# Patient Record
Sex: FEMALE | Race: WHITE | NOT HISPANIC OR LATINO | URBAN - METROPOLITAN AREA
[De-identification: names, ages, dates, MRNs, and addresses within clinical notes are randomized per-mention and may not be internally consistent; named-entity substitution may affect disease eponyms.]

---

## 2021-05-25 ENCOUNTER — INPATIENT (INPATIENT)
Facility: HOSPITAL | Age: 21
LOS: 2 days | Discharge: HOME | End: 2021-05-28
Attending: PSYCHIATRY & NEUROLOGY | Admitting: PSYCHIATRY & NEUROLOGY
Payer: COMMERCIAL

## 2021-05-25 ENCOUNTER — INPATIENT (INPATIENT)
Facility: HOSPITAL | Age: 21
LOS: 0 days | Discharge: PSYCHIATRIC OR SUBSTANCE USE FACILITY | DRG: 885 | End: 2021-05-25
Attending: PSYCHIATRY & NEUROLOGY | Admitting: PSYCHIATRY & NEUROLOGY
Payer: COMMERCIAL

## 2021-05-25 VITALS
DIASTOLIC BLOOD PRESSURE: 80 MMHG | HEART RATE: 61 BPM | TEMPERATURE: 98 F | RESPIRATION RATE: 17 BRPM | OXYGEN SATURATION: 97 % | SYSTOLIC BLOOD PRESSURE: 114 MMHG

## 2021-05-25 VITALS — WEIGHT: 126.32 LBS | HEIGHT: 64 IN | TEMPERATURE: 98 F | RESPIRATION RATE: 18 BRPM | OXYGEN SATURATION: 99 %

## 2021-05-25 VITALS — WEIGHT: 110.01 LBS

## 2021-05-25 DIAGNOSIS — F12.10 CANNABIS ABUSE, UNCOMPLICATED: ICD-10-CM

## 2021-05-25 DIAGNOSIS — F60.3 BORDERLINE PERSONALITY DISORDER: ICD-10-CM

## 2021-05-25 DIAGNOSIS — F33.2 MAJOR DEPRESSIVE DISORDER, RECURRENT SEVERE WITHOUT PSYCHOTIC FEATURES: ICD-10-CM

## 2021-05-25 DIAGNOSIS — F43.10 POST-TRAUMATIC STRESS DISORDER, UNSPECIFIED: ICD-10-CM

## 2021-05-25 DIAGNOSIS — F41.1 GENERALIZED ANXIETY DISORDER: ICD-10-CM

## 2021-05-25 DIAGNOSIS — F32.9 MAJOR DEPRESSIVE DISORDER, SINGLE EPISODE, UNSPECIFIED: ICD-10-CM

## 2021-05-25 LAB
ALBUMIN SERPL ELPH-MCNC: 3.8 G/DL — SIGNIFICANT CHANGE UP (ref 3.3–5)
ALP SERPL-CCNC: 55 U/L — SIGNIFICANT CHANGE UP (ref 40–120)
ALT FLD-CCNC: 17 U/L — SIGNIFICANT CHANGE UP (ref 12–78)
ANION GAP SERPL CALC-SCNC: 4 MMOL/L — LOW (ref 5–17)
APAP SERPL-MCNC: < 2 UG/ML (ref 10–30)
APPEARANCE UR: CLEAR — SIGNIFICANT CHANGE UP
AST SERPL-CCNC: 13 U/L — LOW (ref 15–37)
BASOPHILS # BLD AUTO: 0.03 K/UL — SIGNIFICANT CHANGE UP (ref 0–0.2)
BASOPHILS NFR BLD AUTO: 0.4 % — SIGNIFICANT CHANGE UP (ref 0–2)
BILIRUB SERPL-MCNC: 0.2 MG/DL — SIGNIFICANT CHANGE UP (ref 0.2–1.2)
BILIRUB UR-MCNC: NEGATIVE — SIGNIFICANT CHANGE UP
BUN SERPL-MCNC: 9 MG/DL — SIGNIFICANT CHANGE UP (ref 7–23)
CALCIUM SERPL-MCNC: 10 MG/DL — SIGNIFICANT CHANGE UP (ref 8.5–10.1)
CHLORIDE SERPL-SCNC: 108 MMOL/L — SIGNIFICANT CHANGE UP (ref 96–108)
CO2 SERPL-SCNC: 27 MMOL/L — SIGNIFICANT CHANGE UP (ref 22–31)
COLOR SPEC: YELLOW — SIGNIFICANT CHANGE UP
COVID-19 SPIKE DOMAIN AB INTERP: POSITIVE
COVID-19 SPIKE DOMAIN ANTIBODY RESULT: 41.9 U/ML — HIGH
CREAT SERPL-MCNC: 0.65 MG/DL — SIGNIFICANT CHANGE UP (ref 0.5–1.3)
DIFF PNL FLD: NEGATIVE — SIGNIFICANT CHANGE UP
EOSINOPHIL # BLD AUTO: 0.21 K/UL — SIGNIFICANT CHANGE UP (ref 0–0.5)
EOSINOPHIL NFR BLD AUTO: 3 % — SIGNIFICANT CHANGE UP (ref 0–6)
ETHANOL SERPL-MCNC: <10 MG/DL — SIGNIFICANT CHANGE UP (ref 0–10)
GLUCOSE SERPL-MCNC: 96 MG/DL — SIGNIFICANT CHANGE UP (ref 70–99)
GLUCOSE UR QL: NEGATIVE MG/DL — SIGNIFICANT CHANGE UP
HCG SERPL-ACNC: <1 MIU/ML — SIGNIFICANT CHANGE UP
HCT VFR BLD CALC: 39.4 % — SIGNIFICANT CHANGE UP (ref 34.5–45)
HGB BLD-MCNC: 12.9 G/DL — SIGNIFICANT CHANGE UP (ref 11.5–15.5)
HIV 1 & 2 AB SERPL IA.RAPID: SIGNIFICANT CHANGE UP
IMM GRANULOCYTES NFR BLD AUTO: 0.3 % — SIGNIFICANT CHANGE UP (ref 0–1.5)
KETONES UR-MCNC: NEGATIVE — SIGNIFICANT CHANGE UP
LEUKOCYTE ESTERASE UR-ACNC: ABNORMAL
LYMPHOCYTES # BLD AUTO: 0.89 K/UL — LOW (ref 1–3.3)
LYMPHOCYTES # BLD AUTO: 12.9 % — LOW (ref 13–44)
MCHC RBC-ENTMCNC: 30.2 PG — SIGNIFICANT CHANGE UP (ref 27–34)
MCHC RBC-ENTMCNC: 32.7 GM/DL — SIGNIFICANT CHANGE UP (ref 32–36)
MCV RBC AUTO: 92.3 FL — SIGNIFICANT CHANGE UP (ref 80–100)
MONOCYTES # BLD AUTO: 0.17 K/UL — SIGNIFICANT CHANGE UP (ref 0–0.9)
MONOCYTES NFR BLD AUTO: 2.5 % — SIGNIFICANT CHANGE UP (ref 2–14)
NEUTROPHILS # BLD AUTO: 5.59 K/UL — SIGNIFICANT CHANGE UP (ref 1.8–7.4)
NEUTROPHILS NFR BLD AUTO: 80.9 % — HIGH (ref 43–77)
NITRITE UR-MCNC: NEGATIVE — SIGNIFICANT CHANGE UP
PCP SPEC-MCNC: SIGNIFICANT CHANGE UP
PH UR: 7 — SIGNIFICANT CHANGE UP (ref 5–8)
PLATELET # BLD AUTO: 282 K/UL — SIGNIFICANT CHANGE UP (ref 150–400)
POTASSIUM SERPL-MCNC: 4.2 MMOL/L — SIGNIFICANT CHANGE UP (ref 3.5–5.3)
POTASSIUM SERPL-SCNC: 4.2 MMOL/L — SIGNIFICANT CHANGE UP (ref 3.5–5.3)
PROT SERPL-MCNC: 8.3 GM/DL — SIGNIFICANT CHANGE UP (ref 6–8.3)
PROT UR-MCNC: NEGATIVE MG/DL — SIGNIFICANT CHANGE UP
RAPID RVP RESULT: DETECTED
RBC # BLD: 4.27 M/UL — SIGNIFICANT CHANGE UP (ref 3.8–5.2)
RBC # FLD: 13.2 % — SIGNIFICANT CHANGE UP (ref 10.3–14.5)
RV+EV RNA SPEC QL NAA+PROBE: DETECTED
SALICYLATES SERPL-MCNC: <1.7 MG/DL — LOW (ref 2.8–20)
SARS-COV-2 IGG+IGM SERPL QL IA: 41.9 U/ML — HIGH
SARS-COV-2 IGG+IGM SERPL QL IA: POSITIVE
SARS-COV-2 RNA SPEC QL NAA+PROBE: SIGNIFICANT CHANGE UP
SARS-COV-2 RNA SPEC QL NAA+PROBE: SIGNIFICANT CHANGE UP
SODIUM SERPL-SCNC: 139 MMOL/L — SIGNIFICANT CHANGE UP (ref 135–145)
SP GR SPEC: 1.01 — SIGNIFICANT CHANGE UP (ref 1.01–1.02)
UROBILINOGEN FLD QL: NEGATIVE MG/DL — SIGNIFICANT CHANGE UP
WBC # BLD: 6.91 K/UL — SIGNIFICANT CHANGE UP (ref 3.8–10.5)
WBC # FLD AUTO: 6.91 K/UL — SIGNIFICANT CHANGE UP (ref 3.8–10.5)

## 2021-05-25 PROCEDURE — 90792 PSYCH DIAG EVAL W/MED SRVCS: CPT

## 2021-05-25 PROCEDURE — 0225U NFCT DS DNA&RNA 21 SARSCOV2: CPT

## 2021-05-25 PROCEDURE — 99285 EMERGENCY DEPT VISIT HI MDM: CPT

## 2021-05-25 PROCEDURE — 93010 ELECTROCARDIOGRAM REPORT: CPT

## 2021-05-25 RX ORDER — VENLAFAXINE HCL 75 MG
150 CAPSULE, EXT RELEASE 24 HR ORAL DAILY
Refills: 0 | Status: DISCONTINUED | OUTPATIENT
Start: 2021-05-25 | End: 2021-05-25

## 2021-05-25 RX ORDER — TRAZODONE HCL 50 MG
100 TABLET ORAL AT BEDTIME
Refills: 0 | Status: DISCONTINUED | OUTPATIENT
Start: 2021-05-25 | End: 2021-05-25

## 2021-05-25 RX ADMIN — Medication 150 MILLIGRAM(S): at 18:29

## 2021-05-25 NOTE — ED BEHAVIORAL HEALTH ASSESSMENT NOTE - PSYCHIATRIC ISSUES AND PLAN (INCLUDE STANDING AND PRN MEDICATION)
restart home meds: Buspar 10mg PO BID, Venlafaxine 275 mg, Trazodne 100mg at HS, and Haldol5/ativan 2 mg PRN for agitation.

## 2021-05-25 NOTE — ED PROVIDER NOTE - CLINICAL SUMMARY MEDICAL DECISION MAKING FREE TEXT BOX
21 y/o here for psych evaluation, medical screening exam, psych consult. 19 y/o here for psych evaluation, medical screening exam, psych consult-->requires involuntary admission

## 2021-05-25 NOTE — ED BEHAVIORAL HEALTH ASSESSMENT NOTE - DESCRIPTION
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   [facility] phone [phone].   Therapist Abi states that to told her today that she would use gun if she had access to it. But, since her brother got rid of his gun, she expressed plan to go home into the [place] , look for something to hang herself.   medication is clarified. Pt takes Effexor xr 150mg po qam and n ot 275 like pt said earlier. Buspar is 10mg po BID and olbp1alcrw is 100mg at HS.     Therapist suggests  pt to be admitted to inpatient  psychiatry for safety, further eval and treatment.   Thera are no beds in HH   Informed mother, pt and Ed attending.
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
## 2021-05-25 NOTE — ED PROVIDER NOTE - PATIENT PORTAL LINK FT
You can access the FollowMyHealth Patient Portal offered by [facility] by registering at the following website: [email or web address]. By joining [employer]’s FollowMyHealth portal, you will also be able to view your health information using other applications (apps) compatible with our system.

## 2021-05-25 NOTE — ED ADULT NURSE NOTE - OBJECTIVE STATEMENT
pt arrives to ED ambulatory accompanied by mother complaining fo suicidal thoughts. pt states she has recent stressors of possibility of +HIV test and change in psych medications. pt expresses SI. denies plan. denies HI. alert and oriented x 4. cooperative.

## 2021-05-25 NOTE — ED ADULT TRIAGE NOTE - CHIEF COMPLAINT QUOTE
pt sent by therapist for further psych eval pt states she has been Suicidal for a long time with recent changes to pysch medications

## 2021-05-25 NOTE — ED ADULT NURSE NOTE - NSIMPLEMENTINTERV_GEN_ALL_ED
Implemented All Universal Safety Interventions:  [place] to call system. Call bell, personal items and telephone within reach. Instruct patient to call for assistance. Room bathroom lighting operational. Non-slip footwear when patient is off stretcher. Physically safe environment: no spills, clutter or unnecessary equipment. Stretcher in lowest position, wheels locked, appropriate side rails in place.

## 2021-05-25 NOTE — ED ADULT NURSE REASSESSMENT NOTE - NS ED NURSE REASSESS COMMENT FT1
pt tearfyul, anxious stating she wants to go home. Call made to MD low. awaiting call back. 1:1 continues. pt safety maintained.

## 2021-05-25 NOTE — ED PROVIDER NOTE - PROGRESS NOTE DETAILS
Tonio CHAUHAN for ED attending, Dr. Willard: Sign out to Dr. Rojas pending psych eval. Montse DO: HIV non reactive; patient aware of recommendation for repeat testing as outpatient; cleared by psych at this time; strict return precautions given. Montse DO: S/o to Dr. Rojas pending re-eval by psych at this time. Montse DO: HIV non reactive; patient aware of recommendation for repeat testing as outpatient. seen by psych and will be admitted involuntarily.  MD Sofia no longer a bed for pt at , now awaiting bed elsewhere. MD Sofia

## 2021-05-25 NOTE — ED BEHAVIORAL HEALTH ASSESSMENT NOTE - SUMMARY
Pt is a 20 YOWSW with past hx of BPD , depression, anxiety, followed by therapist and psychiatrist in [facility]: Dr Verde, never hospitalized, self reported multiple self injurious and suicidal   attempts  starting at age 9, then 13-16, 15, 16 and last time in (october 2020, when she put a scarf around her neck, but changed her mind after heard her mother coming in (mother is not aware of any SA)) presents today after expressing SI to her  therapist advised that she si g9ng to buy a gun to kill herself. Pt. States that she has been depressed and anxious and that she has BPD. She was once in partial hospital when she was in college. No inpatient stay. Pt has chr SI, and self injurious behavior. Lately she had a sex with multiple partners , unprotected, and after testing positive for EBV yesterday and receiving results, she was concerned that she may be HIV positive. She had renetta today with her psychiatrist and after that therapist  who sent het to ED.     1.  start home meds   Buspar 10mg po BID,   Venlafaxine 275 mg po qd,  Trazodone 100mg po HS  2. haldol 5mg/hl7qxxr 2 mg for agitation  3. Hold for reassessment pending COVID test and beds availability. Pt is a 20 YOWSW with past hx of BPD , depression, anxiety, followed by therapist and psychiatrist in [facility]: Dr Verde, never hospitalized, self reported multiple self injurious and suicidal   attempts  starting at age 9, then 15-16, 15, 16 and last time in (october 2020, when she put a scarf around her neck, but changed her mind after heard her mother coming in (mother is not aware of any SA)) presents today after expressing SI to her  therapist advised that she si g9ng to buy a gun to kill herself. Pt. States that she has been depressed and anxious and that she has BPD. She was once in partial hospital when she was in college. No inpatient stay. Pt has chr SI, and self injurious behavior. Lately she had a sex with multiple partners , unprotected, and after testing positive for EBV yesterday and receiving results, she was concerned that she may be HIV positive. She had renetta today with her psychiatrist and after that therapist  who sent het to ED.     1.  start home meds   Buspar 10mg po BID,   Venlafaxine 275 mg po qd,  Trazodone 100mg po HS  2. haldol 5mg/fz8reed 2 mg for agitation  3. Hold for reassessment pending COVID test and beds availability (No beds in )

## 2021-05-25 NOTE — ED ADULT NURSE REASSESSMENT NOTE - NS ED NURSE REASSESS COMMENT FT1
Report taken from SC RN. Pt. resting comfortably with 1:1 and boyfriend bedside. Safety maintained. Currently speaking w/telepsych. Awaiting transfer to [facility]
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
## 2021-05-25 NOTE — ED BEHAVIORAL HEALTH ASSESSMENT NOTE - RISK ASSESSMENT
High Acute Suicide Risk HIGH acute risk: pt has SI with plan and intent, anxious, depressed, hopeless, poor coping skills and inability to engage in safety planning.   INCREASED long term risks: family hx, personal hx of multiple SA and self - injurious  behavior, depression, anxiety reckless behavior, hx of trauma,   protective  factors: supportive family   Mitigation: meds, psychotherapy and safety plans

## 2021-05-25 NOTE — ED BEHAVIORAL HEALTH ASSESSMENT NOTE - VIOLENCE RISK FACTORS:
Feeling of being under threat and being unable to control threat/Violent ideation/threat/speech/Substance abuse/Affective dysregulation/Impulsivity
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
## 2021-05-26 PROBLEM — F60.3 BORDERLINE PERSONALITY DISORDER: Chronic | Status: ACTIVE | Noted: 2021-05-25

## 2021-05-26 RX ORDER — ALBUTEROL 90 UG/1
2 AEROSOL, METERED ORAL EVERY 6 HOURS
Refills: 0 | Status: DISCONTINUED | OUTPATIENT
Start: 2021-05-26 | End: 2021-05-28

## 2021-05-26 RX ORDER — TRAZODONE HCL 50 MG
100 TABLET ORAL AT BEDTIME
Refills: 0 | Status: DISCONTINUED | OUTPATIENT
Start: 2021-05-26 | End: 2021-05-28

## 2021-05-26 RX ORDER — ARIPIPRAZOLE 15 MG/1
5 TABLET ORAL AT BEDTIME
Refills: 0 | Status: DISCONTINUED | OUTPATIENT
Start: 2021-05-26 | End: 2021-05-28

## 2021-05-26 RX ORDER — VENLAFAXINE HCL 75 MG
150 CAPSULE, EXT RELEASE 24 HR ORAL DAILY
Refills: 0 | Status: DISCONTINUED | OUTPATIENT
Start: 2021-05-26 | End: 2021-05-28

## 2021-05-26 RX ORDER — TRAZODONE HCL 50 MG
100 TABLET ORAL ONCE
Refills: 0 | Status: COMPLETED | OUTPATIENT
Start: 2021-05-26 | End: 2021-05-26

## 2021-05-26 RX ADMIN — Medication 40 MILLIGRAM(S): at 06:42

## 2021-05-26 RX ADMIN — ALBUTEROL 2 PUFF(S): 90 AEROSOL, METERED ORAL at 20:45

## 2021-05-26 RX ADMIN — Medication 10 MILLIGRAM(S): at 20:42

## 2021-05-26 RX ADMIN — Medication 100 MILLIGRAM(S): at 00:23

## 2021-05-26 RX ADMIN — Medication 150 MILLIGRAM(S): at 08:50

## 2021-05-26 RX ADMIN — Medication 10 MILLIGRAM(S): at 08:50

## 2021-05-26 RX ADMIN — Medication 100 MILLIGRAM(S): at 20:42

## 2021-05-26 RX ADMIN — Medication 10 MILLIGRAM(S): at 00:22

## 2021-05-26 RX ADMIN — ALBUTEROL 2 PUFF(S): 90 AEROSOL, METERED ORAL at 13:42

## 2021-05-26 NOTE — BH INPATIENT PSYCHIATRY ASSESSMENT NOTE - NSACTIVEVENT_PSY_ALL_CORE
Triggering events leading to humiliation, shame, and/or despair (e.g., Loss of relationship, financial or health status) (real or anticipated)/Legal problems

## 2021-05-26 NOTE — BH SOCIAL WORK INITIAL PSYCHOSOCIAL EVALUATION - NSBHEMPLOYED_PSY_ALL_CORE
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

## 2021-05-26 NOTE — BH SOCIAL WORK INITIAL PSYCHOSOCIAL EVALUATION - DETAILS
As per report, pt's father has schizoaffective disorder and mother has bipolar with psychotic features. Fam hx of completed suicide per patient, both on maternal and paternal (cousins).
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
## 2021-05-26 NOTE — BH INPATIENT PSYCHIATRY ASSESSMENT NOTE - NSBHCONSIPREASONDETAILS_PSY_A_CORE FT
told therapist pt wanted to get a gun and therapist, after psych visit saw pt, recommended ED;  denies current tereza plan to harm self

## 2021-05-26 NOTE — BH SOCIAL WORK INITIAL PSYCHOSOCIAL EVALUATION - OTHER PAST PSYCHIATRIC HISTORY (INCLUDE DETAILS REGARDING ONSET, COURSE OF ILLNESS, INPATIENT/OUTPATIENT TREATMENT)
Pt is a 20 year old White single female, living with family, engaged in outpatient  treatment with past hx of BPD, depression and anxiety. Report indicates patient expressed Si to her therapist with thoughts of buying a gun. Pt is a 20 year old White single female, living with family, engaged in outpatient  treatment with past hx of BPD, depression and anxiety. Report indicates patient expressed Si to her therapist with thoughts of buying a gun.    As per EMR- Pt. States that she has been depressed and anxious and that she has BPD. She was once in partial hospital when she was in college. No inpatient stay. Pt has chronic SI, and self injurious behavior. Lately she had a sex with multiple partners , unprotected, and after testing positive for EBV yesterday and receiving results, she was concerned that she may be HIV positive. She had renetta today with her psychiatrist and after that therapist  who sent het to ED.    Pt is a 20 year old White single female, living with family, engaged in outpatient  treatment with past hx of BPD, depression and anxiety. Report indicates patient expressed Si to her therapist with thoughts of buying a gun.    As per EMR- Pt. States that she has been depressed and anxious and that she has BPD. She was once in partial hospital when she was in college. No inpatient stay. Pt has chronic SI, and self injurious behavior. Lately she had a sex with multiple partners , unprotected, and after testing positive for EBV yesterday and receiving results, she was concerned that she may be HIV positive. She had renetta today with her psychiatrist and after that therapist  who sent het to ED.       When speaking with patient she reports much of the ED report is fabricated, and her mother can corroborate. She stated she felt judged in the ED, and only became in a heightened emotional state when psychiatrist in ED was questioning her. On the unit she is calm and cooperative. She reports having in tact outpatient services through [facility], including DBT. She denies SI. Anxiety prior to admission was mostly surrounding possible HIV diagnosis, however test came back negative. She is advocating for discharge.

## 2021-05-26 NOTE — BH INPATIENT PSYCHIATRY ASSESSMENT NOTE - NSTXDCOTHRGOAL_PSY_ALL_CORE
Patient will comply with treatment recommendiations and engage in milieu while on the unit. She will reprot decreased anxiety and depression x7 days
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
## 2021-05-26 NOTE — BH INPATIENT PSYCHIATRY ASSESSMENT NOTE - NSBHCHARTREVIEWVS_PSY_A_CORE FT
Vital Signs Last 24 Hrs  T(C): 36.7 (27 May 2021 20:41), Max: 36.7 (27 May 2021 20:41)  T(F): 98.1 (27 May 2021 20:41), Max: 98.1 (27 May 2021 20:41)  HR: 64 (27 May 2021 08:02) (64 - 64)  BP: 106/55 (27 May 2021 08:02) (106/55 - 106/55)  BP(mean): --  RR: --  SpO2: --

## 2021-05-26 NOTE — CHART NOTE - NSCHARTNOTEFT_GEN_A_CORE
Screening Medical Evaluation  Patient Admitted from: T ED    [facility] admitting diagnosis: Major depressive disorder with single episode    PAST MEDICAL & SURGICAL HISTORY:  Borderline personality disorder          Allergies    No Known Allergies    Intolerances        Social History:   Tobacco Usage:  (x) never smoked   ( ) former smoker  ( ) current smoker; Packs per day:   Alcohol Usage: (x) none  ( ) occasional ( ) 2-3 times a week ( ) daily; Last drink:   Recreational drugs (x) marijuana use       FAMILY HISTORY:      MEDICATIONS  (STANDING):  busPIRone 10 milliGRAM(s) Oral two times a day  predniSONE   Tablet 40 milliGRAM(s) Oral daily  traZODone 100 milliGRAM(s) Oral at bedtime  venlafaxine  milliGRAM(s) Oral daily    MEDICATIONS  (PRN):  ALBUTerol    90 MICROgram(s) HFA Inhaler 2 Puff(s) Inhalation every 6 hours PRN sob/wheeze  LORazepam     Tablet 1 milliGRAM(s) Oral every 6 hours PRN agitation/anxiety  LORazepam   Injectable 1 milliGRAM(s) IntraMuscular once PRN severe agitation      Vital Signs Last 24 Hrs  T(C): 36.5 (25 May 2021 23:15), Max: 37.1 (25 May 2021 12:59)  T(F): 97.7 (25 May 2021 23:15), Max: 98.7 (25 May 2021 12:59)  HR: 61 (25 May 2021 22:18) (61 - 68)  BP: 114/80 (25 May 2021 22:18) (114/80 - 125/73)  BP(mean): 87 (25 May 2021 12:59) (87 - 87)  RR: 18 (25 May 2021 23:15) (17 - 19)  SpO2: 99% (25 May 2021 23:15) (97% - 100%)  CAPILLARY BLOOD GLUCOSE            PHYSICAL EXAM:  GENERAL: NAD, well-developed  HEAD:  Atraumatic, Normocephalic  EYES: EOMI, PERRLA, conjunctiva and sclera clear  NECK: Supple, No JVD  CHEST/LUNG: Clear to auscultation bilaterally; No wheeze  HEART: Regular rate and rhythm; No murmurs, rubs, or gallops  ABDOMEN: Soft, Nontender, Nondistended; Bowel sounds present  EXTREMITIES:  2+ Peripheral Pulses, No clubbing, cyanosis, or edema  PSYCH: AAOx3  NEUROLOGY: non-focal  SKIN: No rashes or lesions    LABS:                        12.9   6.91  )-----------( 282      ( 25 May 2021 13:25 )             39.4         139  |  108  |  9   ----------------------------<  96  4.2   |  27  |  0.65    Ca    10.0      25 May 2021 13:25    TPro  8.3  /  Alb  3.8  /  TBili  0.2  /  DBili  x   /  AST  13<L>  /  ALT  17  /  AlkPhos  55            Urinalysis Basic - ( 25 May 2021 13:25 )    Color: Yellow / Appearance: Clear / S.010 / pH: x  Gluc: x / Ketone: Negative  / Bili: Negative / Urobili: Negative mg/dL   Blood: x / Protein: Negative mg/dL / Nitrite: Negative   Leuk Esterase: Trace / RBC: Negative /HPF / WBC 0-2   Sq Epi: x / Non Sq Epi: Moderate / Bacteria: Few        RADIOLOGY & ADDITIONAL TESTS:    Assessment and Plan:  19 y/o F with hx of asthma presents to [facility] with an admitting diagnosis of Major depressive disorder with single episode. Pt has no medical complaints at this time including fevers, headache, dizziness, changes in vision, chest pain, abdominal pain, N/V/D/C, dysuria.     1. Major depressive disorder with single episode- follow plan per primary team  2. Asthma- continue Albuterol prn, Prednisone as ordered per primary team

## 2021-05-26 NOTE — BH PATIENT PROFILE - NSNUTRITIONASSESS_GEN_ALL_CORE
Chief complaint:   Chief Complaint   Patient presents with   • Foot     left foot pain after jumping off of a chest freezer yesterday.    pateint carried by mother.   OTC:   ice , elevated     • Foot Pain     entire foot : left painful   • Other     Not witnessed. Immediate pain and has been limping since the.  Tolerable pain when at rest, but has difficulty sleeping last nigth due to iintense pain.  No prior h/o left foot injury, truam, fx, surgery.         Vitals:  Visit Vitals  /78   Pulse 84   Temp 99 °F (37.2 °C) (Oral)   Resp 18   Wt 20.9 kg   SpO2 98%       HISTORY OF PRESENT ILLNESS     Verbal permission is given by legal guardian/mother for evaluation and treatment for the chief complaint(s) as documented for today's visit.        Other significant problems:  Patient Active Problem List    Diagnosis Date Noted   • Skin abscess 06/29/2015     Priority: Low     Recurrent skin abscess     • Family history of MRSA infection 06/29/2015     Priority: Low   • Immunization not carried out because of parent refusal 04/24/2014     Priority: Low       PAST MEDICAL, FAMILY AND SOCIAL HISTORY     Medications:  No current outpatient medications on file.     No current facility-administered medications for this visit.        Allergies:  ALLERGIES:  No Known Allergies    Past Medical  History/Surgeries:  Past Medical History:   Diagnosis Date   • MRSA exposure     family members been diagnosed        Past Surgical History:   Procedure Laterality Date   • No past surgeries         Family History:  Family History   Problem Relation Age of Onset   • Skin Mother         MRSA skin abscess; all family members exposed       Social History:  Social History     Tobacco Use   • Smoking status: Never Smoker   Substance Use Topics   • Alcohol use: Not on file       REVIEW OF SYSTEMS     Review of Systems   Musculoskeletal: Negative for back pain and neck pain.   Skin: Negative.    Neurological: Negative.        PHYSICAL EXAM      Physical Exam  Vitals signs and nursing note reviewed.   Constitutional:       General: He is not in acute distress.     Appearance: He is not ill-appearing, toxic-appearing or diaphoretic.   Musculoskeletal:      Right ankle: Normal.      Left ankle: Normal.      Right foot: Normal.      Left foot: Normal range of motion and normal capillary refill. Tenderness and swelling present. No bony tenderness, crepitus, deformity or laceration.        Feet:    Skin:     General: Skin is warm and dry.      Capillary Refill: Capillary refill takes less than 2 seconds.      Findings: No abrasion, laceration or wound.   Neurological:      Mental Status: He is alert and oriented for age.      Sensory: Sensation is intact.      Motor: No atrophy.   Psychiatric:         Behavior: Behavior is cooperative.         ASSESSMENT/PLAN     Injury of left foot, initial encounter  (primary encounter diagnosis)  Foot pain, left  Foot swelling  Sprain of left foot, initial encounter   DOI Sunday, 03/08/2020 at home due to fall.   Orders Placed This Encounter   • XR Foot 3 + View Left     Order Specific Question:   Should Patient Return To MD?     Answer:   Yes     Order Specific Question:   Reason for exam?     Answer:   Jumped off a chest freezer bare foot onto concrete last night. Dorsal foot pain and swelling with lateral foot pain and swelling.   XR LEFT FOOT: No acute process seen. If the radiologist's report varies from what was discussed at today's visit, we will call the patient's mother with an update.  It can take up to 24 hours to receive report from radiology. The mother verbalizes understanding and agreement.  Educational handout(s) with Patient Instructions provided through today's AVS.  Over the counter acetaminophen or NSAID for pain as directed on product label as appropriate for age and weight.  Assistance in scheduling a one week recheck with a new PCP; no current PCP.  Prompt recheck if symptoms become worse in the  meantime.  Questions were answered to the mother's satisfaction.     Patient does not exhibit any risk factors

## 2021-05-26 NOTE — BH INPATIENT PSYCHIATRY ASSESSMENT NOTE - CURRENT MEDICATION
MEDICATIONS  (STANDING):  ARIPiprazole 5 milliGRAM(s) Oral at bedtime  busPIRone 10 milliGRAM(s) Oral two times a day  predniSONE   Tablet 40 milliGRAM(s) Oral daily  traZODone 100 milliGRAM(s) Oral at bedtime  venlafaxine  milliGRAM(s) Oral daily    MEDICATIONS  (PRN):  ALBUTerol    90 MICROgram(s) HFA Inhaler 2 Puff(s) Inhalation every 6 hours PRN sob/wheeze  LORazepam     Tablet 1 milliGRAM(s) Oral every 6 hours PRN agitation/anxiety  LORazepam   Injectable 1 milliGRAM(s) IntraMuscular once PRN severe agitation

## 2021-05-26 NOTE — BH PATIENT PROFILE - HOME MEDICATIONS
BuSpar 10 mg oral tablet , 1 tab(s) orally 2 times a day  traZODone 100 mg oral tablet , 1 tab(s) orally once a day (at bedtime)  Effexor XR 75 mg oral capsule, extended release , 2 cap(s) orally once a day

## 2021-05-26 NOTE — BH INPATIENT PSYCHIATRY ASSESSMENT NOTE - NSSUICPROTFACT_PSY_ALL_CORE
Supportive social network of family or friends/Engaged in work or school/Positive therapeutic relationships/Ability to cope with stress

## 2021-05-26 NOTE — BH INPATIENT PSYCHIATRY ASSESSMENT NOTE - NSBHMETABOLIC_PSY_ALL_CORE_FT
BMI: BMI (kg/m2): 21.7 (05-25-21 @ 23:15)  HbA1c:   Glucose:   BP: 106/55 (05-27-21 @ 08:02) (92/69 - 106/55)  Lipid Panel:

## 2021-05-26 NOTE — BH INPATIENT PSYCHIATRY ASSESSMENT NOTE - RISK ASSESSMENT
mod acute risk: pt has SI but unclear plan and intent, anxious, depressed, poor coping skills and poor toleration of affect  INCREASED long term risks: family hx, personal hx of multiple SA and self - injurious  behavior, depression, anxiety, ?reckless behavior, hx of trauma,   protective  factors: supportive family, in therapy/psych care/DBT program  Mitigation: meds, psychotherapy and safety plans

## 2021-05-26 NOTE — BH INPATIENT PSYCHIATRY ASSESSMENT NOTE - NSBHASSESSSUMMFT_PSY_ALL_CORE
19 yo WF with BPD and SI with unclear plan or intent; recent college grad [employer], current therapy and psychiatry and DBT program, on effexor+traz+buspar    Suggest trial of abilify to aid mood stability.

## 2021-05-27 PROCEDURE — 99222 1ST HOSP IP/OBS MODERATE 55: CPT

## 2021-05-27 RX ORDER — DESVENLAFAXINE 50 MG/1
1 TABLET, EXTENDED RELEASE ORAL
Qty: 30 | Refills: 0
Start: 2021-05-27 | End: 2021-06-25

## 2021-05-27 RX ADMIN — Medication 100 MILLIGRAM(S): at 20:47

## 2021-05-27 RX ADMIN — Medication 10 MILLIGRAM(S): at 08:50

## 2021-05-27 RX ADMIN — Medication 150 MILLIGRAM(S): at 08:51

## 2021-05-27 RX ADMIN — Medication 40 MILLIGRAM(S): at 06:42

## 2021-05-27 RX ADMIN — Medication 1 MILLIGRAM(S): at 20:47

## 2021-05-27 RX ADMIN — Medication 10 MILLIGRAM(S): at 20:47

## 2021-05-27 NOTE — BH TREATMENT PLAN - NSCMSPTSTRENGTHS_PSY_ALL_CORE
Assertive/Expressive of emotions/Financial stability/Intact employment/Intelligence/Motivated/Physically healthy/Resourceful/Supportive family

## 2021-05-27 NOTE — BH INPATIENT PSYCHIATRY PROGRESS NOTE - NSBHASSESSSUMMFT_PSY_ALL_CORE
19 yo WF with BPD and SI with unclear plan or intent; recent college grad [employer], current therapy and psychiatry and DBT program, on effexor+traz+buspar    Suggest trial of abilify to aid mood stability. 21 yo WF with BPD and SI with unclear plan or intent; recent college grad [employer], current therapy and psychiatry and DBT program, on effexor+traz+buspar    Suggest trial of abilify to aid mood stability.    Compliant with above meds but not abilfy.

## 2021-05-27 NOTE — BH INPATIENT PSYCHIATRY PROGRESS NOTE - NSBHADMITCOUNSEL_PSY_A_CORE
risks and benefits of treatment options/instructions for management, treatment and follow up/importance of adherence to chosen treatment/risk factor reduction/client/family/caregiver education/prognosis

## 2021-05-27 NOTE — BH INPATIENT PSYCHIATRY PROGRESS NOTE - OTHER
dysphoric victimization themes, r/o paranoia;  no longer endorses suicidality or wish for a firearm pt narrative pressured at times numerous overvalued ideas and numerous inferential logic errors making it difficult to reason with pt at times

## 2021-05-27 NOTE — BH INPATIENT PSYCHIATRY PROGRESS NOTE - NSBHCONSBHPROVDETAILS_PSY_A_CORE  FT
Provider not in clinic until FRI Provider not in clinic until FRI,  for Dr. Garcia and psychologist at [facility] in [facility].

## 2021-05-27 NOTE — BH TREATMENT PLAN - NSTXCOPEINTERPR_PSY_ALL_CORE
Psychiatric rehabilitation staff approached patient to orient her to psychiatric rehabilitation staff and services. Patient was  receptive to psychiatric rehabilitation staff engagement and was able to collaborate with psychiatric rehabilitation staff and choose a psychiatric rehabilitation goal.  Patient was observed as verbal and cooperative during assessment with writer.  Patient reported an "okay" mood and was observed with a slightly constricted affect during initial assessment.  Patient denied suicidal ideation/intent/plan.  Patient denied auditory and visual hallucinations.  Patient was able to identify reason for admission and was oriented x3.  Patient was observed with fair appearance/hygiene.  Patient demonstrated good impulse control and maintained poor/fair/appropriate eye contact throughout the interview. Patient spoke in a low tone of voice and at a normal rate.  Patient’s thought process is assessed as linear. Patient is assessed with fair insight and judgement.  Patient was admitted to [facility] due to suicidal ideation, however patient denied suicidal ideation/intent/plan as well as self-injurious behavior.    Psychiatric Rehabilitation staff will continuously engage patient in order to build on a therapeutic rapport and to assist in achieving the following goals during the next 7 days: identifying 1-2 healthy and effective coping skills as well as attending daily psycho/education groups for improved symptom management.   Patient was observed wearing a mask in response to the COVID-19 pandemic.

## 2021-05-27 NOTE — BH TREATMENT PLAN - NSTXPLANTHERAPYSESSIONSFT_PSY_ALL_CORE
05-26-21  Type of therapy: Other,Initial psych rehab assessment  Type of session: Individual  Level of patient participation: Engaged  Duration of participation: 15 minutes  Therapy conducted by: Psych rehab  Therapy Summary: Psychiatric rehabilitation staff approached patient to orient her to psychiatric rehabilitation staff and services. Patient was  receptive to psychiatric rehabilitation staff engagement and was able to collaborate with psychiatric rehabilitation staff and choose a psychiatric rehabilitation goal.  Patient was observed as verbal and cooperative during assessment with writer.  Patient reported an "okay" mood and was observed with a slightly constricted affect during initial assessment.  Patient denied suicidal ideation/intent/plan.  Patient denied auditory and visual hallucinations.  Patient was able to identify reason for admission and was oriented x3.  Patient was observed with fair appearance/hygiene.  Patient demonstrated good impulse control and maintained poor/fair/appropriate eye contact throughout the interview. Patient spoke in a low tone of voice and at a normal rate.  Patient’s thought process is assessed as linear. Patient is assessed with fair insight and judgement.  Patient was admitted to [facility] 6 due to suicidal ideation, however patient denied suicidal ideation/intent/plan as well as self-injurious behavior.    Psychiatric Rehabilitation staff will continuously engage patient in order to build on a therapeutic rapport and to assist in achieving the following goals during the next 7 days: identifying 1-2 healthy and effective coping skills as well as attending daily psycho/education groups for improved symptom management.   Patient was observed wearing a mask in response to the COVID-19 pandemic.

## 2021-05-28 VITALS — TEMPERATURE: 97 F

## 2021-05-28 PROCEDURE — 90832 PSYTX W PT 30 MINUTES: CPT

## 2021-05-28 PROCEDURE — 99239 HOSP IP/OBS DSCHRG MGMT >30: CPT

## 2021-05-28 RX ORDER — TRAZODONE HCL 50 MG
1 TABLET ORAL
Qty: 0 | Refills: 0 | DISCHARGE

## 2021-05-28 RX ORDER — ALBUTEROL 90 UG/1
2 AEROSOL, METERED ORAL
Qty: 1 | Refills: 0
Start: 2021-05-28 | End: 2021-06-26

## 2021-05-28 RX ORDER — VENLAFAXINE HCL 75 MG
2 CAPSULE, EXT RELEASE 24 HR ORAL
Qty: 0 | Refills: 0 | DISCHARGE

## 2021-05-28 RX ADMIN — Medication 10 MILLIGRAM(S): at 09:24

## 2021-05-28 RX ADMIN — Medication 40 MILLIGRAM(S): at 07:37

## 2021-05-28 RX ADMIN — Medication 150 MILLIGRAM(S): at 09:24
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
## 2021-05-28 NOTE — BH INPATIENT PSYCHIATRY DISCHARGE NOTE - NSDCCPCAREPLAN_GEN_ALL_CORE_FT
PRINCIPAL DISCHARGE DIAGNOSIS  Diagnosis: Borderline personality disorder in adult  Assessment and Plan of Treatment:

## 2021-05-28 NOTE — BH INPATIENT PSYCHIATRY DISCHARGE NOTE - OTHER PAST PSYCHIATRIC HISTORY (INCLUDE DETAILS REGARDING ONSET, COURSE OF ILLNESS, INPATIENT/OUTPATIENT TREATMENT)
Pt is a 20 year old White single female, living with family, engaged in outpatient  treatment with past hx of BPD, depression and anxiety. Report indicates patient expressed Si to her therapist with thoughts of buying a gun.    As per EMR- Pt. States that she has been depressed and anxious and that she has BPD. She was once in partial hospital when she was in college. No inpatient stay. Pt has chronic SI, and self injurious behavior. Lately she had a sex with multiple partners , unprotected, and after testing positive for EBV yesterday and receiving results, she was concerned that she may be HIV positive. She had renetta today with her psychiatrist and after that therapist  who sent het to ED.       When speaking with patient she reports much of the ED report is fabricated, and her mother can corroborate. She stated she felt judged in the ED, and only became in a heightened emotional state when psychiatrist in ED was questioning her. On the unit she is calm and cooperative. She reports having in tact outpatient services through [facility], including DBT. She denies SI. Anxiety prior to admission was mostly surrounding possible HIV diagnosis, however test came back negative. She is advocating for discharge.

## 2021-05-28 NOTE — BH INPATIENT PSYCHIATRY PROGRESS NOTE - NSBHASSESSSUMMFT_PSY_ALL_CORE
21 yo WF with BPD and SI with unclear plan or intent; recent college grad [employer], current therapy and psychiatry and DBT program, on effexor+traz+buspar    Suggest trial of abilify to aid mood stability.    Compliant with above meds but not abilfy.

## 2021-05-28 NOTE — BH INPATIENT PSYCHIATRY PROGRESS NOTE - NSTXSUBMISINTERMD_PSY_ALL_CORE
psychopharm and supportive therapy and return to psych/therapy/DBT;  MI
psychopharm and supportive therapy and return to psych/therapy/DBT;  MI

## 2021-05-28 NOTE — BH INPATIENT PSYCHIATRY PROGRESS NOTE - PRN MEDS
MEDICATIONS  (PRN):  ALBUTerol    90 MICROgram(s) HFA Inhaler 2 Puff(s) Inhalation every 6 hours PRN sob/wheeze  LORazepam     Tablet 1 milliGRAM(s) Oral every 6 hours PRN agitation/anxiety  LORazepam   Injectable 1 milliGRAM(s) IntraMuscular once PRN severe agitation  

## 2021-05-28 NOTE — BH INPATIENT PSYCHIATRY DISCHARGE NOTE - NSBHDCSIGEVENTSFT_PSY_A_CORE
Continued from above:    DISCHARGE PLAN  1)	Pt stable for DC to home with mother with Rx meds- not filled via [facility] by pt preference;  2)	Psychopharm, as below:    All Active Home Medications at time of Discharge Reconciliation: 28-May-2021 14:38    albuterol 90 mcg/inh inhalation aerosol 2 puff(s) inhaled every 6 hours, As needed, sob/wheeze     BuSpar 10 mg oral tablet 1 tab(s) orally 2 times a day     traZODone 100 mg oral tablet 1 tab(s) orally once a day (at bedtime)     venlafaxine 150 mg oral capsule, extended release 1 cap(s) orally once a day      3)	Long Acting Injectible medication (HAMMOND): Currently none; can consider ARISTADA vs Maintena, if desired, after trial of PO abilify.  4) Abilify  5) Effexor/pristiq not optimized, but pristiq covered by insurance- suggest 50-75 mg QHS of pristiq; QHS will aid compliance. Ideally, replace buspar+Effexor vs pristiq with abilify as mood stabilizer/anxiolytic.  6) Labs:  Basic labs as per [place], grossly WNL;  utox POS cannab;  HIV NEG but recent EBV POS;  Covid NEG but entero virus POS; covid ab levels=42.  7) Pt would benefit from continued psychotherapy including trauma work and DBT group.  8) NUT follow up while on any SGA/HAMMOND and encourage exercise.  9) Mother a very important support (pt resides with mother)- encourage family meeting and education of support/s.  10) Pt wishes to return to employment/.  11) PCP f/u with MD prn for medical comorbids/metabolic monitoring.  12) Sleep: trazodone qhs continued; suggest all Effexor and/or abilify as QHS schedule.  13) Substance use: cannabis use disorder  14) Smoking: none known    Additional discharge-related matters:  All Rx meds given for 30 days, no refills- unless otherwise indicated.  Controlled substances given for 30 days MAX, unless otherwise indicated.  DUE DATES for all [place] indicated in DISCHARGE PLAN section. Any questions about HAMMOND/proposed future due dates, please call attending, as below.  Writer and inpatient team can be reached at [facility] unit M-F at: [phone] (can request MD and/or leave message with [facility] Unit receptionist).

## 2021-05-28 NOTE — BH INPATIENT PSYCHIATRY DISCHARGE NOTE - NSBHDCHANDOFFFT_PSY_ALL_CORE
Signout via phone today to Dr. Garcia  meds and plan reviewed; return to therapy TUES and DBT group run by therapist 5/28/21 Signout via phone to Dr. Garcia  meds and plan reviewed; return to therapy TUES and DBT group run by therapist. She agrees pt would benefit from a mood stabilizer, eg, abilify and writer recommened HAMMOND option.

## 2021-05-28 NOTE — BH DISCHARGE NOTE NURSING/SOCIAL WORK/PSYCH REHAB - NSDCPRRECOMMEND_PSY_ALL_CORE
Patient was provided with a Press Ganey survey and a Patient Safety Template prior to discharge.     Psychiatric rehabilitation staff recommends that patient return to outpatient treatment, and DBT treatment for ongoing medication management, support and psychotherapy.

## 2021-05-28 NOTE — BH INPATIENT PSYCHIATRY PROGRESS NOTE - NSTXIMPULSGOAL_PSY_ALL_CORE
Will identify 1 reason or reinforcement for impulsive behavior
Will identify 1 reason or reinforcement for impulsive behavior

## 2021-05-28 NOTE — BH INPATIENT PSYCHIATRY PROGRESS NOTE - NSBHCONSBHPROVDETAILS_PSY_A_CORE  FT
Provider not in clinic until FRI,  for Dr. Garcia and psychologist at [facility] in [facility].

## 2021-05-28 NOTE — BH INPATIENT PSYCHIATRY PROGRESS NOTE - NSBHFUPINTERVALHXFT_PSY_A_CORE
FRI Case discussed at team meeting, Rn report received, pt seen, MSE done. Pt notably less argumentative and apologetic about her criticisms of MD. MD writer had challenged pt on her victimization themes and that in fact, pt was common denominator in all her interactions and grievances, and it would be difficult to go forwards in DBT without accepting personal responsibility for this. Said she was open to trial of abilfy as o/p, wanted to leave today and felt ready. Notably not labile or dysphoric. Met with writer outside for productive session. Did not need refill on meds and steroid 4 day course completed for 3 days, so felt this was adequate for her asthma. Returns to o/p provider on TUES.   reached Dr. Sanchez o/p provider, collateral received, also from therapist. PT prone to provocative statements at times.  Sure she has much to live for. Listed persons and things and her dog. Sure she was ready to go home.  Stable for DC.  No new SEs reported or observed.
Case discussed at team meeting, Rn report received, pt seen, MSE done. Pt very argumentative and labile, storms off from interviews with MD. Accusatory and allegation prone, insists MD in ED was lying about her, yet cannot identify any motive for such. Complained of male peers in the bathroom last night and MHW sleeping on their shift. Has notebook with detailed allegations and mistake and grievances by staff. Victimization themes that appear to rise to level of paranoia, at times. Team willing to consider DC by FRI with collateral from psychiatrist and clin psych on unit to see pt by FRI to aid with DC plan. PT and team offered transfer to female vs college track unit with likely delays vs possible FRI DC from this unit and pt clearly opted for latter. Denies tereza wish to harm self.  Psychiatrist called in pm for collateral, left message. Also to call therapist for collateral.  Pt refusing trial of abilfy. Later in PM called unit to update pt, pt says "I spoke to the other doctors on the unit and they all said an inpt psychiatrist does not change medication." also said writer had commented on her intelligence. Yet, no other MDs present on unit, nor had writer commented on intelligence of pt. Pt appears intelligent in fact, but with notably poor insight. Pt feels she has learned much in her DBT group but this is not overly evident at times. Characteristic borderline distortions very evident at times. Then tells writer another pt on the unit not being treated for an allergy attack, yet, no such allergy attack occurred.  Pt clarified she did not feel an MD should prescribe medication after speaking with a pt for one hour, as yesterday for example.  Pt very prone to distraction with all manner of grievances, and not overly linear at such times. [place] rationalizing and intellectualizing and very defensive.  No new SEs reported or observed.

## 2021-05-28 NOTE — BH DISCHARGE NOTE NURSING/SOCIAL WORK/PSYCH REHAB - DISCHARGE INSTRUCTIONS AFTERCARE APPOINTMENTS
In order to check the location, date, or time of your aftercare appointment, please refer to your Discharge Instructions Document given to you upon leaving the hospital.  If you have lost the instructions please call [phone]

## 2021-05-28 NOTE — BH INPATIENT PSYCHIATRY PROGRESS NOTE - NSTXDCOTHRINTERMD_PSY_ALL_CORE
psychopharm and supportive therapy and return to psych/therapy/DBT
psychopharm and supportive therapy and return to psych/therapy/DBT

## 2021-05-28 NOTE — BH INPATIENT PSYCHIATRY PROGRESS NOTE - NSTXSUBMISGOAL_PSY_ALL_CORE
Be able to acknowledge that substance abuse is a problem

## 2021-05-28 NOTE — BH INPATIENT PSYCHIATRY PROGRESS NOTE - NSTXSUICIDGOAL_PSY_ALL_CORE
Be able to state 3 reasons for living

## 2021-05-28 NOTE — BH DISCHARGE NOTE NURSING/SOCIAL WORK/PSYCH REHAB - PATIENT PORTAL LINK FT
You can access the FollowMyHealth Patient Portal offered by [facility] by registering at the following website: [email or web address]. By joining [employer]’s FollowMyHealth portal, you will also be able to view your health information using other applications (apps) compatible with our system.

## 2021-05-28 NOTE — BH INPATIENT PSYCHIATRY PROGRESS NOTE - OTHER
pt narrative no longer dysphoric but more towards euthymic victimization themes, attenuated;  no longer endorses suicidality or wish for a firearm and sure she is hopeful and wishes to go on living

## 2021-05-28 NOTE — BH INPATIENT PSYCHIATRY PROGRESS NOTE - NSBHFUPINTERVALCCFT_PSY_A_CORE
SI+plan?+depression+borderline pathology with impulsivity+severe anxiety+cannabis use disorder
SI+plan?+depression+borderline pathology with impulsivity+severe anxiety+cannabis use disorder
Passive SI vs SIIP, depressed mood+severe anxiety+borderline pathology
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
## 2021-05-28 NOTE — BH INPATIENT PSYCHIATRY PROGRESS NOTE - NSBHCHARTREVIEWVS_PSY_A_CORE FT
Vital Signs Last 24 Hrs  T(C): 36.6 (27 May 2021 08:02), Max: 36.6 (27 May 2021 08:02)  T(F): 97.8 (27 May 2021 08:02), Max: 97.8 (27 May 2021 08:02)  HR: 64 (27 May 2021 08:02) (64 - 64)  BP: 106/55 (27 May 2021 08:02) (106/55 - 106/55)  BP(mean): --  RR: --  SpO2: --
Vital Signs Last 24 Hrs  T(C): 36.2 (28 May 2021 08:06), Max: 36.7 (27 May 2021 20:41)  T(F): 97.2 (28 May 2021 08:06), Max: 98.1 (27 May 2021 20:41)  HR: --  BP: --  BP(mean): --  RR: --  SpO2: --
Vital Signs Last 24 Hrs  T(C): 36.2 (28 May 2021 08:06), Max: 36.7 (27 May 2021 20:41)  T(F): 97.2 (28 May 2021 08:06), Max: 98.1 (27 May 2021 20:41)  HR: --  BP: --  BP(mean): --  RR: --  SpO2: --

## 2021-05-28 NOTE — BH INPATIENT PSYCHIATRY PROGRESS NOTE - NSBHMSEAFFRANGE_PSY_A_CORE
PATIENT PRESENTS VIA EMS WITH SOB.  PATIENT IS GETTING READY FOR A TAVR PROCEDURE WITH DR MORRIS.  RECENT CARDIAC CATH WAS CLEAN AS PER DAUGHTER. PATIENT HAS HAD SOME SOB OVER THE PAST COUPLE OF DAYS. YESTERDAY HAD SOB BUT DAUGHTER STATES IT RESOLVED SO SHE DID NOT COME TO HOSPITAL.  TODAY PT HAD SOB AND IT WAS NOT RESOLVING SO SHE CAME TO ER. NO CHEST PAIN.  NO DIAPHORESIS OR NAUSEA. NO PERIPHERAL EDEMA OR WEIGHT GAIN NOTED.
Constricted/Other
Labile/Constricted
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
## 2021-05-28 NOTE — BH DISCHARGE NOTE NURSING/SOCIAL WORK/PSYCH REHAB - NSBHREFERPURPOSE1_PSY_ALL_CORE
Mental Health Treatment Once you meet with Abi, the clinic will make an appointment with Dr. Verde./Mental Health Treatment

## 2021-05-28 NOTE — BH DISCHARGE NOTE NURSING/SOCIAL WORK/PSYCH REHAB - NSCDUDCCRISIS_PSY_A_CORE
[facility]  1 (262) [facility] ([phone])  Text "WELL" to 58884  Website: www.[employer]/.[facility] 1 (913) 511-RUZO (3101) Website: www.safehorizon.org/.National Suicide Prevention Lifeline 4 (781) 371-5403/.  [facility]  1 (095) LIFENET (861-9731)/.  [facility]  [address] [place] [address] [phone]   Hours:  Monday through Friday from 9 AM to 3 PM/.  [facility] Crisis Line  1 [phone], Option 1

## 2021-05-28 NOTE — BH INPATIENT PSYCHIATRY PROGRESS NOTE - NSBHMSEKNOWHOW_PSY_ALL_CORE
Current Events/Educational attainment/Vocabulary/Other...
Current Events/Educational attainment/Vocabulary/Other...

## 2021-05-28 NOTE — BH INPATIENT PSYCHIATRY PROGRESS NOTE - NSICDXBHPRIMARYDX_PSY_ALL_CORE
Emotionally unstable borderline personality disorder   F60.3  
Emotionally unstable borderline personality disorder   F60.3

## 2021-05-28 NOTE — BH INPATIENT PSYCHIATRY PROGRESS NOTE - NSTXDEPRESGOAL_PSY_ALL_CORE
Will identify 2 coping skills that assist in improving mood

## 2021-06-03 DIAGNOSIS — F33.2 MAJOR DEPRESSIVE DISORDER, RECURRENT SEVERE WITHOUT PSYCHOTIC FEATURES: ICD-10-CM

## 2021-06-03 DIAGNOSIS — F43.10 POST-TRAUMATIC STRESS DISORDER, UNSPECIFIED: ICD-10-CM

## 2021-06-03 DIAGNOSIS — Z91.5 PERSONAL HISTORY OF SELF-HARM: ICD-10-CM

## 2021-06-03 DIAGNOSIS — F12.159 CANNABIS ABUSE WITH PSYCHOTIC DISORDER, UNSPECIFIED: ICD-10-CM

## 2021-06-03 DIAGNOSIS — F60.3 BORDERLINE PERSONALITY DISORDER: ICD-10-CM

## 2021-06-03 DIAGNOSIS — Z81.8 FAMILY HISTORY OF OTHER MENTAL AND BEHAVIORAL DISORDERS: ICD-10-CM

## 2021-06-03 DIAGNOSIS — B27.00 GAMMAHERPESVIRAL MONONUCLEOSIS WITHOUT COMPLICATION: ICD-10-CM

## 2021-06-03 DIAGNOSIS — R45.851 SUICIDAL IDEATIONS: ICD-10-CM

## 2021-06-03 DIAGNOSIS — F41.1 GENERALIZED ANXIETY DISORDER: ICD-10-CM

## 2021-06-10 ENCOUNTER — EMERGENCY (EMERGENCY)
Facility: HOSPITAL | Age: 21
LOS: 0 days | Discharge: HOME | End: 2021-06-10
Attending: EMERGENCY MEDICINE
Payer: COMMERCIAL

## 2021-06-10 VITALS
SYSTOLIC BLOOD PRESSURE: 132 MMHG | RESPIRATION RATE: 18 BRPM | HEART RATE: 64 BPM | TEMPERATURE: 99 F | DIASTOLIC BLOOD PRESSURE: 65 MMHG | OXYGEN SATURATION: 100 %

## 2021-06-10 VITALS — WEIGHT: 128.09 LBS | HEIGHT: 64 IN

## 2021-06-10 DIAGNOSIS — F60.3 BORDERLINE PERSONALITY DISORDER: ICD-10-CM

## 2021-06-10 DIAGNOSIS — R42 DIZZINESS AND GIDDINESS: ICD-10-CM

## 2021-06-10 PROCEDURE — 99284 EMERGENCY DEPT VISIT MOD MDM: CPT

## 2021-06-10 PROCEDURE — 99281 EMR DPT VST MAYX REQ PHY/QHP: CPT

## 2021-06-10 RX ORDER — TRAZODONE HCL 50 MG
1 TABLET ORAL
Qty: 30 | Refills: 0
Start: 2021-06-10 | End: 2021-07-09

## 2021-06-10 RX ORDER — VENLAFAXINE HCL 75 MG
1 CAPSULE, EXT RELEASE 24 HR ORAL
Qty: 30 | Refills: 0
Start: 2021-06-10 | End: 2021-07-09

## 2021-06-10 NOTE — ED STATDOCS - CONSTITUTIONAL, MLM
well appearing and in no apparent distress. normal... well appearing and in no apparent distress. Normal affect

## 2021-06-10 NOTE — ED STATDOCS - OBJECTIVE STATEMENT
21 y/o with PMHx of borderline personality disorder presents to ED for medication refill. Pt states she was d/c from [facility] at the end of May, did not receive full prescription or d/c paperwork. Last dose of medications 3 days ago. Reporting dizziness today. Requesting Trazadone, Buspirone, Venlafaxine.

## 2021-06-10 NOTE — ED STATDOCS - HIV OFFER
[FreeTextEntry1] : Ultrasound-guided FNA right breast complex hypoechoic mass performed under sterile conditions using 1% lidocaine with epinephrine\par 20-gauge and 18-gauge needle used\par Minimal fluid aspirated and sent for cytology\par Patient tolerated procedure well\par Sterile dressing applied
Previously Negative (within the last year)

## 2021-06-10 NOTE — ED STATDOCS - PATIENT PORTAL LINK FT
You can access the FollowMyHealth Patient Portal offered by [facility] by registering at the following website: [email or web address]. By joining [employer]’s FollowMyHealth portal, you will also be able to view your health information using other applications (apps) compatible with our system.

## 2021-06-10 NOTE — ED ADULT TRIAGE NOTE - CHIEF COMPLAINT QUOTE
patient requesting prescription refill.  trazadone, busparone and venlafaxine.   Last dose three days ago.  Patient was at [facility]/ at the end of May.

## 2021-06-10 NOTE — ED STATDOCS - NSFOLLOWUPINSTRUCTIONS_ED_ALL_ED_FT
Follow up with your psychiatric team as scheduled  Anything worsens or persists, return to ER for further care and evaluation.

## 2021-06-10 NOTE — ED STATDOCS - NS ED ATTENDING STATEMENT MOD
Progress Notes by Deni Sen MD at 11/03/17 08:42 AM     Author:  Dnei Sen MD Service:  (none) Author Type:  Physician     Filed:  11/03/17 09:10 AM Encounter Date:  11/3/2017 Status:  Signed     :  Deni Sen MD (Physician)            Mireille Thomson is a[VP1.1C] 66 year old[VP1.1T] female who presents with T1DM.  Patient also with hx of hypothyroidism    Patient reports T1DM since age 10.[VP1.1C]  Lab Results      Component  Value Date    A1C 8.1 11/02/2017[VP1.1T]     Minimed 630G  Denies retinopathy Denies nephropathy Denies vascular disease Denies neuropathy    Patient presents today for follow-up.  She feels well today without major complaints.[VP1.1C]   Needs flu vaccine  A1c increased, states couldn't find her meter for about 2-4 weeks.  Rare hypo, low alert set for 65 on CGM.  Using CGM most days.  Moving later this year.[VP1.1M]        Past Medical History:     Diagnosis  Date   • Bilateral carotid bruits 12/30/2016   • DM type 1, not at goal    • Hypoglycemia    • Insulin pump in place     medtronic, 12/12         No past surgical history on file.    Allergies     Allergen  Reactions   • Ceftin Angioedema   • Sulfa Antibiotics         Current Outpatient Prescriptions     Medication  Sig   • lisinopril (PRINIVIL,ZESTRIL) 2.5 MG tablet Take 1 Tab by mouth daily.   • simvastatin (ZOCOR) 40 MG tablet TAKE 1 TABLET BY MOUTH EVERY NIGHT AT BEDTIME   • insulin lispro (HUMALOG) 100 UNIT/ML injection Inject up to 55 units daily via Insulin Pump as directed.   • insulin aspart (NOVOLOG) 100 UNIT/ML injection INJECT UP TO 55 UNITS DAILY VIA INSULIN PUMP AS DIRECTED   • levothyroxine 100 MCG tablet Take 1 Tab by mouth daily.   • lisinopril (PRINIVIL,ZESTRIL) 2.5 MG tablet TAKE 1 TABLET BY MOUTH DAILY   • ASCORBIC ACID    • aspirin 81 MG chewable tablet EVERY DAY   • FERROUS SULFATE    • CUSTOM FORMULATION -- SEE SIG Insulin pump   • insulin glargine (LANTUS) 100 UNIT/ML injection Use in case of  pump failure   • Glucagon, rDNA, (GLUCAGON EMERGENCY) 1 MG injection Inject  as directed. Use in emergency   • glucose blood (RAJANI CONTOUR NEXT TEST) test strip Test blood sugar 8 times daily   • Insulin Syringe-Needle U-100 (INSULIN SYRINGE 1CC/29G) 29G X 1/2\" 1 ML XX MISC USE EVERY DAY WITH INSULIN   • INSULIN SYRINGE-NEEDLE U-100 29G X 1/2\" 0.5 ML MISC Use as directed        Family History       Problem   Relation Age of Onset   • Cancer  Father      lung cancer     • Cancer  Sister      breast cancer     • * Healthy  Brother    • * Healthy  Sister    • OTHER  Sister      osteoporosis         Social History     Social History      • Marital status:       Spouse name: N/A   • Number of children:  N/A   • Years of education:  N/A     Occupational History    • Not on file.     Social History Main Topics     • Smoking status: Never Smoker   • Smokeless tobacco: Never Used   • Alcohol use No   • Drug use: No   • Sexual activity: Not on file     Other Topics  Concern   • Not on file      Social History Narrative     # Plantar fascial rupture L Sept 2014 managed by Podiatry        # Osteoporosis on Actonel and calcium w/D -Actonel started April 2014.        #  Insulin-dependent diabetes since age 10.  Now on an insulin pump managed by ENDO        # Hypothyroidism, on thyroid hormone replacement therapy. Managed by ENDO.          #  Celiac disease, anemia and Vit D deficiency-see GI evaluation.          # Hyperlipidemia, .        # History pyelonephritis early 2011..  Had cysto Aug 2011.  Was on Macrobid per Urology for frequent UTIs but now off the med and no recent UTIs        #  History of elevated alkaline phosphatase.  It was minimal elevated at 143 in February 2010 and and normal again Sept 2014.        # History of rheumatic fever at age 17.  Echocardiogram in March 2006 showed no valvular abnormalities and a normal ejection fraction.        # History of tick bite treated with Doxycycline spring 2009.         # History of respiratory infection spring 2009.  Seaman view negative early .        # Central spinal canal stenosis at L3-L4 and L4-L5 with intermittent back   pain undergoing lumbar epidural steroid injections with resolution of  symptoms.         # History of trigger finger/tenosynovitis of several fingers in the past.          #  Surgeries included bilateral rotator cuff surgery in , appendectomy          in , sinus surgery removing a polyp. She had breast aspiration for          fibrocystic breast disease, surgery on her stapes to improve her hearing, and          also  times 3.[VP1.1T]                          ROS:  Constitutional: No weight loss, fatigue  Eyes: no vision changes  Respiratory: No SOB, cough, wheezing, snoring  CV: No chest pain, palpitations  GI: No abdo pain, change in bowels, nausea/vomiting  : no hematuria  Skin: No rashes, increased pigmentation  Psych: No depression, anxiety  All other systems reviewed are negative    Vitals:[VP1.1C] /60  Pulse 68  Ht 5' 10\" (1.778 m)  Wt 171 lb (77.6 kg)  BMI 24.54 kg/m2[VP1.1T]    O/E:  General appearance: Healthy and thin   HEENT: no proptosis, exophthalmous, lid retraction, or lid lag and EOMI  Neck: Supple, thyroid not palpable, no palpable nodules bruit to left neck  Chest: Clear bilaterally and no signs of distress  CV: RRR and S1 S2  Abdo: Soft and non-tender  Extr: No edema;   Neuro: no tremor of outstretched hands  Skin: No striae  Lymph: No cervical lymphadenopathy  Psych:  AAO x 3    Labs/Imaging:  Results for CHERELLE LOYD (MRN [id number]) as of 11/3/2017 08:42   Ref. Range 2016 09:44 3/24/2017 09:11 2017 10:17 2017 10:20 2017 08:41   BLOOD UREA NITROGEN Latest Ref Range: 7 - 20 mg/dL  36 (H)  25 (H) 42 (H)   CREATININE, SERUM Latest Ref Range: 0.5 - 1.4 mg/dL  1.2  1.0 1.0   BILIRUBIN, TOTAL Latest Ref Range: 0.0 - 1.0 mg/dL  <0.2  <0.2    PROTEIN, TOTAL SERUM Latest Ref Range: 6.2 - 8.1  g/dL  7.3  7.2    ALBUMIN Latest Ref Range: 3.6 - 5.1 g/dL  4.4  4.4    NA (SODIUM, SERUM) Latest Ref Range: 136 - 146 mmol/L  138  137 139   K (POTASSIUM, SERUM) Latest Ref Range: 3.5 - 5.3 mmol/L  4.7  5.5 (H) 5.0   CHLORIDE, SERUM Latest Ref Range: 96 - 107 mmol/L  104  107 109 (H)   CO2 VENOUS Latest Ref Range: 22 - 32 mmol/L  24  22 20 (L)   GLUCOSE, FASTING Latest Ref Range: 60 - 100 mg/dL  131 (H)  202 (H)    GLUCOSE, RANDOM Latest Ref Range: 70 - 200 mg/dL     165   CALCIUM, SERUM Latest Ref Range: 8.6 - 10.6 mg/dL  9.7  9.6 9.7   eGFR AFRICAN AMERICAN-R Latest Ref Range: >60 mL/min/1.73m2  55 (L)  >60 >60   eGFR NON--R Latest Ref Range: >60 mL/min/1.73m2  46 (L)  56 (L) 52 (L)   HEMOGLOBIN A1C Latest Ref Range: 4.2 - 6.0 % 8.7 (H) 7.7 (H)  6.7 (H) 8.1 (H)   THYROID PROF(TSH w R Latest Ref Range: 0.30 - 4.82 m[iU]/L   1.54     THYROID STIMULATING Latest Ref Range: 0.30 - 4.82 m[iU]/L 0.17 (L) 0.23 (L)      CHOLESTEROL Latest Ref Range: 140 - 200 mg/dL  198  216 (H) 161   HIGH DENSITY LIPOPRO Latest Ref Range: >40 mg/dL  79  66 65   LOW DENSITY LIPOPROT Latest Ref Range: 0 - 100 mg/dL  104 (H)  125 (H) 65   TRIGLYCERIDES Latest Ref Range: 0 - 200 mg/dL  72  123 153   MICROALBUMIN/CREATININE RATIO Latest Ref Range: 0.0 - 30.0 mg/G  12.9  17.2      1/2017 Carotid Duplex  Right ICA stenosis: <50%.    * Left ICA stenosis: <50%.    * There is antegrade flow in both the right and left vertebral arteries    CM interpretation:  CGM downloaded and reviewed.[VP1.1C]    Wear duration 4d 5hr,   Typical rise from 10am to 2pm and mild rise after 7pm.  Stable at goal readings 2-8am  Rec:  Consider adjusting basal rates during hyperglycemic excursion[VP1.1M]    A/P: Mireille Thomson is a[VP1.1C] 66 year old[VP1.1T] female with   1.  T1DM on pump  - settings adjusted[VP1.1C] for more basal during lunch hours, CGM low alert changed to 80.[VP1.1M]  - awaiting 670G[VP1.1C]  - a1c in 3m  - on 2.5mg lisinopril with 1  mild episoe of proteinura, BP low normal - stopping today and repeating urine 1 month  - flu vaccine today[VP1.1M]    2. Hypothyroidism  - on 100mcg,    3. Neuropathy  - counseled on feet    4. HLD  - continue simvastatin    RTC[VP1.1C] prn - moving[VP1.1M]    Electronically Signed by:    Deni Sen MD , 11/3/2017[VP1.1T]      Revision History        User Key Date/Time User Provider Type Action    > VP1.1 11/03/17 09:10 AM Deni Sen MD Physician Sign    C - Copied, M - Manual, T - Template             Attending Only

## 2021-06-11 NOTE — SOCIAL WORK POST DISCHARGE FOLLOW UP NOTE - NSBHSWFOLLOWUP_PSY_ALL_CORE_FT
Writer received call from patient (VM) and pt's therapist. As per therapist, clinic MD could not see her for another 2 weeks and pt is reporting running out of medication. Writer notified unit MD and called patient and left  a VM. Please note, pt left AMA on 3DL after days of requesting and demanding dc. Writer left message for patient advising that MD was made aware, and provided information of [facility] and going to nearest emergency room for med refill.

## 2021-09-18 ENCOUNTER — EMERGENCY (EMERGENCY)
Facility: HOSPITAL | Age: 21
LOS: 0 days | Discharge: HOME | End: 2021-09-18
Attending: EMERGENCY MEDICINE
Payer: COMMERCIAL

## 2021-09-18 VITALS
DIASTOLIC BLOOD PRESSURE: 85 MMHG | SYSTOLIC BLOOD PRESSURE: 133 MMHG | OXYGEN SATURATION: 98 % | HEIGHT: 64 IN | TEMPERATURE: 98 F | HEART RATE: 90 BPM | WEIGHT: 119.93 LBS | RESPIRATION RATE: 18 BRPM

## 2021-09-18 DIAGNOSIS — H92.02 OTALGIA, LEFT EAR: ICD-10-CM

## 2021-09-18 DIAGNOSIS — L03.90 CELLULITIS, UNSPECIFIED: ICD-10-CM

## 2021-09-18 DIAGNOSIS — F60.3 BORDERLINE PERSONALITY DISORDER: ICD-10-CM

## 2021-09-18 DIAGNOSIS — Z76.0 ENCOUNTER FOR ISSUE OF REPEAT PRESCRIPTION: ICD-10-CM

## 2021-09-18 PROCEDURE — 99281 EMR DPT VST MAYX REQ PHY/QHP: CPT

## 2021-09-18 PROCEDURE — 99283 EMERGENCY DEPT VISIT LOW MDM: CPT

## 2021-09-18 RX ORDER — TRAZODONE HCL 50 MG
3 TABLET ORAL
Qty: 12 | Refills: 0
Start: 2021-09-18 | End: 2021-09-21

## 2021-09-18 RX ORDER — TRAZODONE HCL 50 MG
1 TABLET ORAL
Qty: 30 | Refills: 0
Start: 2021-09-18 | End: 2021-10-17

## 2021-09-18 RX ORDER — TRAZODONE HCL 50 MG
2 TABLET ORAL
Qty: 8 | Refills: 0
Start: 2021-09-18 | End: 2021-09-21

## 2021-09-18 RX ADMIN — Medication 1 TABLET(S): at 15:54

## 2021-09-18 NOTE — ED STATDOCS - CLINICAL SUMMARY MEDICAL DECISION MAKING FREE TEXT BOX
Will treat with Augmentin small erythema to left ear and short term refill of Trazadone.  Return precautions discussed.   Abbi Jewell PA-C

## 2021-09-18 NOTE — ED STATDOCS - OBJECTIVE STATEMENT
22 y/o female with a PMHx of borderline personality disorder presents to the ED c/o left ear pain. Pt also requesting a refill of her Trazadone. Pt has an appt with her doctor on 10/4. No other complaints at this time.

## 2021-09-18 NOTE — ED STATDOCS - NSFOLLOWUPINSTRUCTIONS_ED_ALL_ED_FT
Cellulitis    WHAT YOU NEED TO KNOW:    Cellulitis is a skin infection caused by bacteria. Cellulitis may go away on its own or you may need treatment. Your healthcare provider may draw a Lime around the outside edges of your cellulitis. If your cellulitis spreads, your healthcare provider will see it outside of the Lime. Cellulitis          DISCHARGE INSTRUCTIONS:    Call 911 if:     You have sudden trouble breathing or chest pain.        Return to the emergency department if:     Your wound gets larger and more painful.       You feel a crackling under your skin when you touch it.      You have purple dots or bumps on your skin, or you see bleeding under your skin.      You have new swelling and pain in your legs.      The red, warm, swollen area gets larger.      You see red streaks coming from the infected area.    Contact your healthcare provider if:     You have a fever.      Your fever or pain does not go away or gets worse.      The area does not get smaller after 2 days of antibiotics.      Your skin is flaking or peeling off.      You have questions or concerns about your condition or care.    Medicines:     Antibiotics help treat the bacterial infection.       NSAIDs, such as ibuprofen, help decrease swelling, pain, and fever. NSAIDs can cause stomach bleeding or kidney problems in certain people. If you take blood thinner medicine, always ask if NSAIDs are safe for you. Always read the medicine label and follow directions. Do not give these medicines to children under 6 months of age without direction from your child's healthcare provider.      Acetaminophen decreases pain and fever. It is available without a doctor's order. Ask how much to take and how often to take it. Follow directions. Read the labels of all other medicines you are using to see if they also contain acetaminophen, or ask your doctor or pharmacist. Acetaminophen can cause liver damage if not taken correctly. Do not use more than 4 grams (4,000 milligrams) total of acetaminophen in one day.       Take your medicine as directed. Contact your healthcare provider if you think your medicine is not helping or if you have side effects. Tell him or her if you are allergic to any medicine. Keep a list of the medicines, vitamins, and herbs you take. Include the amounts, and when and why you take them. Bring the list or the pill bottles to follow-up visits. Carry your medicine list with you in case of an emergency.    Self-care:     Elevate the area above the level of your heart as often as you can. This will help decrease swelling and pain. Prop the area on pillows or blankets to keep it elevated comfortably.       Clean the area daily until the wound scabs over. Gently wash the area with soap and water. Pat dry. Use dressings as directed.       Place cool or warm, wet cloths on the area as directed. Use clean cloths and clean water. Leave it on the area until the cloth is room temperature. Pat the area dry with a clean, dry cloth. The cloths may help decrease pain.     Prevent cellulitis:     Do not scratch bug bites or areas of injury. You increase your risk for cellulitis by scratching these areas.       Do not share personal items, such as towels, clothing, and razors.       Clean exercise equipment with germ-killing  before and after you use it.      Wash your hands often. Use soap and water. Wash your hands after you use the bathroom, change a child's diapers, or sneeze. Wash your hands before you prepare or eat food. Use lotion to prevent dry, cracked skin. Handwashing           Wear pressure stockings as directed. You may be told to wear the stockings if you have peripheral edema. The stockings improve blood flow and decrease swelling.      Treat athlete’s foot. This can help prevent the spread of a bacterial skin infection.    Follow up with your healthcare provider within 3 days, or as directed: Your healthcare provider will check if your cellulitis is getting better. You may need different medicine. Write down your questions so you remember to ask them during your visits.

## 2021-09-18 NOTE — ED STATDOCS - PATIENT PORTAL LINK FT
You can access the FollowMyHealth Patient Portal offered by [facility] by registering at the following website: [email or web address]. By joining [employer]’s FollowMyHealth portal, you will also be able to view your health information using other applications (apps) compatible with our system.

## 2021-09-18 NOTE — ED STATDOCS - NSFOLLOWUPCLINICS_GEN_ALL_ED_FT
[facility]  Family Medicine  [address] [place] [address]  Phone: [phone]  Fax:

## 2021-09-18 NOTE — ED STATDOCS - PROGRESS NOTE DETAILS
Will treat with Augmentin small erythema to left ear and short term refill of Trazadone.  Return precautions discussed.   Abbi Jewell PA-C Pt. presents with left ear pain x weeks.  Neg. trauma or discharge.  Pt. also requesting refill of her Trazadone.  Abbi Jewell PA-C

## 2021-09-18 NOTE — ED STATDOCS - CARE PLAN
Principal Discharge DX:	Cellulitis   1 Principal Discharge DX:	Cellulitis  Secondary Diagnosis:	Medication refill

## 2021-09-18 NOTE — ED STATDOCS - ENMT, MLM
Nasal mucosa clear.  Mouth with normal mucosa  Throat has no vesicles, no oropharyngeal exudates and uvula is midline. Nasal mucosa clear.  Mouth with normal mucosa  Throat has no vesicles, no oropharyngeal exudates and uvula is midline. Mild left ear redness along the pinna.

## 2021-09-27 ENCOUNTER — EMERGENCY (EMERGENCY)
Facility: HOSPITAL | Age: 21
LOS: 0 days | Discharge: HOME | End: 2021-09-27
Attending: EMERGENCY MEDICINE
Payer: COMMERCIAL

## 2021-09-27 VITALS
TEMPERATURE: 98 F | HEART RATE: 68 BPM | DIASTOLIC BLOOD PRESSURE: 77 MMHG | SYSTOLIC BLOOD PRESSURE: 135 MMHG | RESPIRATION RATE: 18 BRPM | OXYGEN SATURATION: 97 %

## 2021-09-27 VITALS — HEIGHT: 64 IN

## 2021-09-27 DIAGNOSIS — F17.200 NICOTINE DEPENDENCE, UNSPECIFIED, UNCOMPLICATED: ICD-10-CM
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
## 2021-09-27 DIAGNOSIS — T78.40XA ALLERGY, UNSPECIFIED, INITIAL ENCOUNTER: ICD-10-CM

## 2021-09-27 DIAGNOSIS — R21 RASH AND OTHER NONSPECIFIC SKIN ERUPTION: ICD-10-CM
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
## 2021-09-27 DIAGNOSIS — Z91.018 ALLERGY TO OTHER FOODS: ICD-10-CM

## 2021-09-27 DIAGNOSIS — J45.909 UNSPECIFIED ASTHMA, UNCOMPLICATED: ICD-10-CM

## 2021-09-27 DIAGNOSIS — F60.3 BORDERLINE PERSONALITY DISORDER: ICD-10-CM

## 2021-09-27 PROCEDURE — 99284 EMERGENCY DEPT VISIT MOD MDM: CPT

## 2021-09-27 PROCEDURE — 99282 EMERGENCY DEPT VISIT SF MDM: CPT

## 2021-09-27 RX ORDER — EPINEPHRINE 0.3 MG/.3ML
0.1 INJECTION INTRAMUSCULAR; SUBCUTANEOUS
Qty: 1 | Refills: 0
Start: 2021-09-27

## 2021-09-27 RX ORDER — DIPHENHYDRAMINE HCL 50 MG
25 CAPSULE ORAL ONCE
Refills: 0 | Status: COMPLETED | OUTPATIENT
Start: 2021-09-27 | End: 2021-09-27

## 2021-09-27 RX ORDER — EPINEPHRINE 0.3 MG/.3ML
0.3 INJECTION INTRAMUSCULAR; SUBCUTANEOUS
Qty: 1 | Refills: 0
Start: 2021-09-27

## 2021-09-27 RX ORDER — DEXAMETHASONE 0.5 MG/5ML
6 ELIXIR ORAL ONCE
Refills: 0 | Status: COMPLETED | OUTPATIENT
Start: 2021-09-27 | End: 2021-09-27

## 2021-09-27 RX ADMIN — Medication 25 MILLIGRAM(S): at 11:31

## 2021-09-27 RX ADMIN — Medication 6 MILLIGRAM(S): at 11:31

## 2021-09-27 NOTE — ED PROVIDER NOTE - PATIENT PORTAL LINK FT
You can access the FollowMyHealth Patient Portal offered by [facility] by registering at the following website: [email or web address]. By joining [employer]’s FollowMyHealth portal, you will also be able to view your health information using other applications (apps) compatible with our system.

## 2021-09-27 NOTE — ED PROVIDER NOTE - PHYSICAL EXAMINATION
Physical Exam:  Gen: NAD, AOx3, non-toxic appearing, able to ambulate without assistance  HEENT: EOMI, PEERL, normal conjunctiva, tongue midline, oral mucosa moist, no pharyngeal swelling or erythema   Lung: CTAB, no respiratory distress, no wheezes/rhonchi/rales B/L, speaking in full sentences  CV: RRR, no murmurs, rubs or gallops  Abd: soft, NT, ND, no guarding  MSK: no visible deformities, ROM normal in UE/LE, no back pain  Neuro: No focal sensory or motor deficits  Skin: Warm, well perfused, +small hives to B/L shoulders and chest and R knee, no erythema to L ear, no e/o persistent cellulitis infection   Psych: normal affect, calm  Yolie Mccurdy D.O. -Resident

## 2021-09-27 NOTE — ED PROVIDER NOTE - NSFOLLOWUPCLINICS_GEN_ALL_ED_FT
[facility]  Family Medicine  [address] [place] [address]  Phone: [phone]  Fax:   Follow Up Time: 7-10 Days

## 2021-09-27 NOTE — ED PROVIDER NOTE - OBJECTIVE STATEMENT
21y F w/ PMHx Borderline Personality Disorder and Asthma presenting to ED with complaint of hives and throat swelling this morning after being on Augmentin x9 days (prescribed 9/18-/9/27 for cellulitis of L ear). Patient states she took her evening dose last night and woke up this morning with a "scratchy throat" and hives to her shoulders and legs. States she did not take her morning dose of Augmentin prior to arrival and states she has known family hx of allergies to amoxacillin. States she did not take anything prior to arrival and endorses her symptoms have improved, not endorsing throat discomfort, scratchiness, pain, or difficulty breathing. Does endorse extensive food allergies, but states she has introduced no new foods into her diet or started any new medications. Denies previous hx of anaphylaxis. Patient able to provide hx, sitting comfortably in room in no respiratory distress, tolerating secretions, no trouble speaking. LMP 9/25/21.

## 2021-09-27 NOTE — ED PROVIDER NOTE - CLINICAL SUMMARY MEDICAL DECISION MAKING FREE TEXT BOX
21y F w/ PMHx Borderline Personality Disorder and Asthma presenting to ED with complaint of hives and throat swelling this morning after being on Augmentin x9 days (prescribed 9/18-/9/27 for cellulitis of L ear). VS WNL, no drooling, tolerating secretions, no respiratory distress, no pharyngeal swelling or erythema, +hives to B/L shoulder and knees. Will treat with Benadryl and Decadron and reassess. Plan to observe to 12:30 (4 hours from onset of symptoms).

## 2021-09-27 NOTE — ED PROVIDER NOTE - PROGRESS NOTE DETAILS
Tonio FOWLER for Dr. Lindsey: 22 y/o female presents to the ED c/o allergic reaction. Pt was placed on Augmentin for 9 days on 09/18 for L ear cellulitis. Pt developed itchy rash which worsened the last day and also c/o itchy throat.  Agree with resident's plan treating with Benadryl and f/u with PMD. Kaz ARIAS (PGY-3): Patient endorses her rash has improved, no longer itchy, no throat discomfort or swelling, no respiratory distress, lungs remain clear to ausculation. Medically stable for discharge.

## 2021-09-27 NOTE — ED PROVIDER NOTE - NS ED ROS FT
CONSTITUTIONAL: No fevers, no chills, no lightheadedness, no dizziness  MOUTH/THROAT: +throat itching   CV: No chest pain, no palpitations  RESP: No SOB, no cough  GI: No n/v/d, no abd pain  : no dysuria, no hematuria, no flank pain  MSK: no back pain, no extremity pain  SKIN: +pruritic rash   NEURO: no headache, no focal weakness, no decreased sensation/paresthesias

## 2021-09-27 NOTE — ED ADULT TRIAGE NOTE - CHIEF COMPLAINT QUOTE
pt presents to Ed with complaints of allergic reaction to amoxicillin prescribed for cellulitis of ear in [facility] on last visit on 9/18. pt states she started to get hives to b/l shoulders yesterday and today has an itchy throat. pt came for eval due to family history of anaphylaxis to amoxicillin. pt speaking in full sentence in triage. in no distress.

## 2021-11-20 NOTE — BH SOCIAL WORK INITIAL PSYCHOSOCIAL EVALUATION - NSBHHOME_PSY_ALL_CORE
NONINVASIVE VENTILATION    PROVIDE OPTIMAL VENTILATION/ACCEPTABLE SPO2   IMPLEMENT NONINVASIVE VENTILATION PROTOCOL   MAINTAIN ACCEPTABLE SPO2   ASSESS SKIN INTEGRITY/BREAKDOWN SCORE   PATIENT EDUCATION AS NEEDED   BIPAP AS NEEDED Home with Family

## 2022-05-17 NOTE — ED PROVIDER NOTE - CARE PLAN
Medication Requested:  amphetamine-dextroamphetamine (Adderall XR) 30 MG 24 hr capsule    Last Rx written: 3-14-22 w/ start date 4-13-22    Last Rx dispensed (per PDMP): 4-14-22    Last appt: 3-14-22   Follow up: 3 months  Cancelled/No Show appt: none  Upcoming appt: 6/6/2022     Routed to provider for approval   Principal Discharge DX:	Borderline personality disorder   Principal Discharge DX:	Borderline personality disorder  Secondary Diagnosis:	Suicidal ideation

## 2022-07-24 NOTE — ED PROVIDER NOTE - NSFOLLOWUPINSTRUCTIONS_ED_ALL_ED_FT
No - the patient is unable to be screened due to medical condition - Lab and imaging results, if performed, were discussed with you along with your discharge diagnosis    -  You have also been provided with a referral for a primary care physician. Please call to arrange follow-up with a primary care physician within one week.     - Return to the ED for any new, worsening, or concerning symptoms to you including difficulty breathing, trouble tolerating your saliva, tongue or lip swelling     - Continue all prescribed medications    - Rest and keep yourself hydrated with fluids    - You have also been prescribed an EpiPen to use in the event of anaphylaxis which means difficulty breathing, tongue or mouth swelling, nausea or vomiting. It is a one-time rescue dose that you should carry on you in the event of a severe allergic reaction.

## 2023-02-19 ENCOUNTER — EMERGENCY (EMERGENCY)
Facility: HOSPITAL | Age: 23
LOS: 0 days | Discharge: HOME | End: 2023-02-19
Attending: STUDENT IN AN ORGANIZED HEALTH CARE EDUCATION/TRAINING PROGRAM
Payer: COMMERCIAL

## 2023-02-19 VITALS — HEIGHT: 64 IN | WEIGHT: 149.91 LBS

## 2023-02-19 VITALS
OXYGEN SATURATION: 98 % | SYSTOLIC BLOOD PRESSURE: 114 MMHG | DIASTOLIC BLOOD PRESSURE: 69 MMHG | RESPIRATION RATE: 17 BRPM | TEMPERATURE: 99 F | HEART RATE: 85 BPM

## 2023-02-19 DIAGNOSIS — F60.3 BORDERLINE PERSONALITY DISORDER: ICD-10-CM

## 2023-02-19 DIAGNOSIS — R10.11 RIGHT UPPER QUADRANT PAIN: ICD-10-CM

## 2023-02-19 DIAGNOSIS — S39.011A STRAIN OF MUSCLE, FASCIA AND TENDON OF ABDOMEN, INITIAL ENCOUNTER: ICD-10-CM

## 2023-02-19 DIAGNOSIS — F41.9 ANXIETY DISORDER, UNSPECIFIED: ICD-10-CM
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
## 2023-02-19 DIAGNOSIS — F17.200 NICOTINE DEPENDENCE, UNSPECIFIED, UNCOMPLICATED: ICD-10-CM

## 2023-02-19 DIAGNOSIS — F32.A DEPRESSION, UNSPECIFIED: ICD-10-CM

## 2023-02-19 DIAGNOSIS — Z91.018 ALLERGY TO OTHER FOODS: ICD-10-CM

## 2023-02-19 LAB
ALBUMIN SERPL ELPH-MCNC: 3.7 G/DL — SIGNIFICANT CHANGE UP (ref 3.3–5)
ALP SERPL-CCNC: 51 U/L — SIGNIFICANT CHANGE UP (ref 40–120)
ALT FLD-CCNC: 23 U/L — SIGNIFICANT CHANGE UP (ref 12–78)
ANION GAP SERPL CALC-SCNC: 4 MMOL/L — LOW (ref 5–17)
APPEARANCE UR: CLEAR — SIGNIFICANT CHANGE UP
AST SERPL-CCNC: 20 U/L — SIGNIFICANT CHANGE UP (ref 15–37)
BASOPHILS # BLD AUTO: 0.02 K/UL — SIGNIFICANT CHANGE UP (ref 0–0.2)
BASOPHILS NFR BLD AUTO: 0.2 % — SIGNIFICANT CHANGE UP (ref 0–2)
BILIRUB SERPL-MCNC: 0.3 MG/DL — SIGNIFICANT CHANGE UP (ref 0.2–1.2)
BILIRUB UR-MCNC: NEGATIVE — SIGNIFICANT CHANGE UP
BUN SERPL-MCNC: 12 MG/DL — SIGNIFICANT CHANGE UP (ref 7–23)
CALCIUM SERPL-MCNC: 9.4 MG/DL — SIGNIFICANT CHANGE UP (ref 8.5–10.1)
CHLORIDE SERPL-SCNC: 108 MMOL/L — SIGNIFICANT CHANGE UP (ref 96–108)
CO2 SERPL-SCNC: 28 MMOL/L — SIGNIFICANT CHANGE UP (ref 22–31)
COLOR SPEC: YELLOW — SIGNIFICANT CHANGE UP
CREAT SERPL-MCNC: 0.64 MG/DL — SIGNIFICANT CHANGE UP (ref 0.5–1.3)
DIFF PNL FLD: NEGATIVE — SIGNIFICANT CHANGE UP
EGFR: 126 ML/MIN/1.73M2 — SIGNIFICANT CHANGE UP
EOSINOPHIL # BLD AUTO: 0.06 K/UL — SIGNIFICANT CHANGE UP (ref 0–0.5)
EOSINOPHIL NFR BLD AUTO: 0.6 % — SIGNIFICANT CHANGE UP (ref 0–6)
GLUCOSE SERPL-MCNC: 95 MG/DL — SIGNIFICANT CHANGE UP (ref 70–99)
GLUCOSE UR QL: NEGATIVE — SIGNIFICANT CHANGE UP
HCT VFR BLD CALC: 37.4 % — SIGNIFICANT CHANGE UP (ref 34.5–45)
HGB BLD-MCNC: 12.6 G/DL — SIGNIFICANT CHANGE UP (ref 11.5–15.5)
IMM GRANULOCYTES NFR BLD AUTO: 0.3 % — SIGNIFICANT CHANGE UP (ref 0–0.9)
KETONES UR-MCNC: NEGATIVE — SIGNIFICANT CHANGE UP
LEUKOCYTE ESTERASE UR-ACNC: NEGATIVE — SIGNIFICANT CHANGE UP
LYMPHOCYTES # BLD AUTO: 1.76 K/UL — SIGNIFICANT CHANGE UP (ref 1–3.3)
LYMPHOCYTES # BLD AUTO: 17.1 % — SIGNIFICANT CHANGE UP (ref 13–44)
MCHC RBC-ENTMCNC: 31.3 PG — SIGNIFICANT CHANGE UP (ref 27–34)
MCHC RBC-ENTMCNC: 33.7 GM/DL — SIGNIFICANT CHANGE UP (ref 32–36)
MCV RBC AUTO: 92.8 FL — SIGNIFICANT CHANGE UP (ref 80–100)
MONOCYTES # BLD AUTO: 0.71 K/UL — SIGNIFICANT CHANGE UP (ref 0–0.9)
MONOCYTES NFR BLD AUTO: 6.9 % — SIGNIFICANT CHANGE UP (ref 2–14)
NEUTROPHILS # BLD AUTO: 7.7 K/UL — HIGH (ref 1.8–7.4)
NEUTROPHILS NFR BLD AUTO: 74.9 % — SIGNIFICANT CHANGE UP (ref 43–77)
NITRITE UR-MCNC: NEGATIVE — SIGNIFICANT CHANGE UP
PH UR: 6 — SIGNIFICANT CHANGE UP (ref 5–8)
PLATELET # BLD AUTO: 267 K/UL — SIGNIFICANT CHANGE UP (ref 150–400)
POTASSIUM SERPL-MCNC: 4.2 MMOL/L — SIGNIFICANT CHANGE UP (ref 3.5–5.3)
POTASSIUM SERPL-SCNC: 4.2 MMOL/L — SIGNIFICANT CHANGE UP (ref 3.5–5.3)
PROT SERPL-MCNC: 7.7 GM/DL — SIGNIFICANT CHANGE UP (ref 6–8.3)
PROT UR-MCNC: NEGATIVE — SIGNIFICANT CHANGE UP
RBC # BLD: 4.03 M/UL — SIGNIFICANT CHANGE UP (ref 3.8–5.2)
RBC # FLD: 12.1 % — SIGNIFICANT CHANGE UP (ref 10.3–14.5)
SODIUM SERPL-SCNC: 140 MMOL/L — SIGNIFICANT CHANGE UP (ref 135–145)
SP GR SPEC: 1.01 — SIGNIFICANT CHANGE UP (ref 1.01–1.02)
UROBILINOGEN FLD QL: NEGATIVE — SIGNIFICANT CHANGE UP
WBC # BLD: 10.28 K/UL — SIGNIFICANT CHANGE UP (ref 3.8–10.5)
WBC # FLD AUTO: 10.28 K/UL — SIGNIFICANT CHANGE UP (ref 3.8–10.5)

## 2023-02-19 PROCEDURE — 99284 EMERGENCY DEPT VISIT MOD MDM: CPT | Mod: 25

## 2023-02-19 PROCEDURE — 87086 URINE CULTURE/COLONY COUNT: CPT

## 2023-02-19 PROCEDURE — 85025 COMPLETE CBC W/AUTO DIFF WBC: CPT

## 2023-02-19 PROCEDURE — 99284 EMERGENCY DEPT VISIT MOD MDM: CPT

## 2023-02-19 PROCEDURE — 80053 COMPREHEN METABOLIC PANEL: CPT

## 2023-02-19 PROCEDURE — 96374 THER/PROPH/DIAG INJ IV PUSH: CPT

## 2023-02-19 PROCEDURE — 36415 COLL VENOUS BLD VENIPUNCTURE: CPT

## 2023-02-19 PROCEDURE — 81003 URINALYSIS AUTO W/O SCOPE: CPT

## 2023-02-19 RX ORDER — KETOROLAC TROMETHAMINE 30 MG/ML
30 SYRINGE (ML) INJECTION ONCE
Refills: 0 | Status: DISCONTINUED | OUTPATIENT
Start: 2023-02-19 | End: 2023-02-19

## 2023-02-19 RX ORDER — DIAZEPAM 5 MG
1 TABLET ORAL
Qty: 6 | Refills: 0
Start: 2023-02-19 | End: 2023-02-20

## 2023-02-19 RX ORDER — DIAZEPAM 5 MG
5 TABLET ORAL ONCE
Refills: 0 | Status: DISCONTINUED | OUTPATIENT
Start: 2023-02-19 | End: 2023-02-19

## 2023-02-19 RX ORDER — IBUPROFEN 200 MG
1 TABLET ORAL
Qty: 12 | Refills: 0
Start: 2023-02-19 | End: 2023-02-21

## 2023-02-19 RX ADMIN — Medication 30 MILLIGRAM(S): at 17:52

## 2023-02-19 RX ADMIN — Medication 5 MILLIGRAM(S): at 18:33

## 2023-02-19 NOTE — ED STATDOCS - PHYSICAL EXAMINATION
GENERAL: A&Ox4, non-toxic appearing, no acute distress  HEENT: NCAT, EOMI, oral mucosa moist, normal conjunctiva  RESP: CTAB, no respiratory distress, no wheezes/rhonchi/rales, speaking in full sentences  CV: RRR, no murmurs/rubs/gallops  ABDOMEN: soft, non-tender, non-distended, no guarding, no CVA tenderness  MSK: no visible deformities, no midline spinal tenderness, no paraspinal tenderness  NEURO: no focal sensory or motor deficits, CN 2-12 grossly intact  SKIN: warm, normal color, well perfused, no rash  PSYCH: normal affect

## 2023-02-19 NOTE — ED STATDOCS - OBJECTIVE STATEMENT
22-year-old female PMH anxiety, depression, presents to the emergency department for right flank pain.  Patient states symptoms started 1 day ago, atraumatic.  She does note that she started working out on Thursday but did not have any discomfort after her exercise.  She states the pain is worse with movement and worse with deep inspiration.  She denies fever, chills, chest pain, shortness of breath, nausea, vomiting, diarrhea, or dysuria.  She tried taking Tylenol at home without any relief.

## 2023-02-19 NOTE — ED STATDOCS - NS ED ROS FT
GENERAL: no fever, no chills, no weight loss  EYES: no change in vision, no irritation, no discharge, no redness, no pain  HEENT: no trouble swallowing or speaking, no throat pain, no ear pain  CARDIAC: no chest pain, no palpitations   PULMONARY: no cough, no shortness of breath, no wheezing  GI: +abdominal pain, no nausea, no vomiting, no diarrhea, no constipation, no melena, no hematochezia, no hematemesis  : no changes in urination, no dysuria, no frequency, no hematuria, no discharge  SKIN: no rashes  NEURO: no headache, no numbness, no weakness  MSK: no joint pain, no muscle pain, no back pain, no calf pain

## 2023-02-19 NOTE — ED STATDOCS - NS ED ATTENDING STATEMENT MOD
This was a shared visit with the ZURDO. I reviewed and verified the documentation and independently performed the documented:

## 2023-02-19 NOTE — ED STATDOCS - NSFOLLOWUPINSTRUCTIONS_ED_ALL_ED_FT
Muscle Pain, Adult      Muscle pain, also called myalgia, is a condition in which a person has pain in one or more muscles in the body. Muscle pain may be mild, moderate, or severe. It may feel sharp, achy, or burning. In most cases, the pain lasts only a short time and goes away without treatment.    Muscle pain can result from using muscles in a new or different way or after a period of inactivity. It is normal to feel some muscle pain after starting an exercise program. Muscles that have not been used often will be sore at first.      What are the causes?    This condition is caused by using muscles in a new or different way after a period of inactivity. Other causes may include:  •Overuse or muscle strain, especially if you are not in shape. This is the most common cause of muscle pain.      •Injury or bruising.      •Infectious diseases, including diseases caused by viruses, such as the flu (influenza).      •Fibromyalgia.This is a long-term, or chronic, condition that causes muscle tenderness, tiredness (fatigue), and headache.      •Autoimmune or rheumatologic diseases. These are conditions, such as lupus, in which the body's defense system (immunesystem) attacks areas in the body.      •Certain medicines, including ACE inhibitors and statins.        What are the signs or symptoms?    The main symptom of this condition is sore or painful muscles, including during activity and when stretching. You may also have slight swelling.      How is this diagnosed?    This condition is diagnosed with a physical exam. Your health care provider will ask questions about your pain and when it began. If you have not had muscle pain for very long, your health care provider may want to wait before doing much testing. If your muscle pain has lasted a long time, tests may be done right away. In some cases, this may include tests to rule out certain conditions or illnesses.      How is this treated?    Treatment for this condition depends on the cause. Home care is often enough to relieve muscle pain. Your health care provider may also prescribe NSAIDs, such as ibuprofen.      Follow these instructions at home:    Medicines     •Take over-the-counter and prescription medicines only as told by your health care provider.      •Ask your health care provider if the medicine prescribed to you requires you to avoid driving or using machinery.        Managing pain, swelling, and discomfort       Bag of ice on a towel on the skin.       A heating pad for use on the affected area.   •If directed, put ice on the painful area. To do this:  •Put ice in a plastic bag.      •Place a towel between your skin and the bag.      •Leave the ice on for 20 minutes, 2–3 times a day.      •For the first 2 days of muscle soreness, or if there is swelling:  •Do not soak in hot baths.      •Do not use a hot tub, steam room, sauna, heating pad, or other heat source.      •After 48–72 hours, you may alternate between applying ice and applying heat as told by your health care provider. If directed, apply heat to the affected area as often as told by your health care provider. Use the heat source that your health care provider recommends, such as a moist heat pack or a heating pad.  •Place a towel between your skin and the heat source.      •Leave the heat on for 20–30 minutes.      •Remove the heat if your skin turns bright red. This is especially important if you are unable to feel pain, heat, or cold. You may have a greater risk of getting burned.        •If you have an injury, raise (elevate) the injured area above the level of your heart while you are sitting or lying down.        Activity   A person standing with arms stretched straight out in front and then squatting while keeping the knees over the toes. •If overuse is causing your muscle pain:  •Slow down your activities until the pain goes away.      •Do regular, gentle exercises if you are not usually active.      •Warm up before exercising. Stretch before and after exercising. This can help lower the risk of muscle pain.        • Do not continue working out if the pain is severe. Severe pain could mean that you have injured a muscle.      • Do not lift anything that is heavier than 5–10 lb (2.3–4.5 kg), or the limit that you are told, until your health care provider says that it is safe.      •Return to your normal activities as told by your health care provider. Ask your health care provider what activities are safe for you.      General instructions     • Do not use any products that contain nicotine or tobacco, such as cigarettes, e-cigarettes, and chewing tobacco. These can delay healing. If you need help quitting, ask your health care provider.      •Keep all follow-up visits as told by your health care provider. This is important.        Contact a health care provider if you have:    •Muscle pain that gets worse and medicines do not help.      •Muscle pain that lasts longer than 3 days.      •A rash or fever along with muscle pain.      •Muscle pain after a tick bite.      •Muscle pain while working out, even though you are in good physical condition.      •Redness, soreness, or swelling along with muscle pain.      •Muscle pain after starting a new medicine or changing the dose of a medicine.        Get help right away if you have:    •Trouble breathing.      •Trouble swallowing.      •Muscle pain along with a stiff neck, fever, and vomiting.      •Severe muscle weakness or you cannot move part of your body.      These symptoms may represent a serious problem that is an emergency. Do not wait to see if the symptoms will go away. Get medical help right away. Call your local emergency services (911 in the [place] Do not drive yourself to the hospital.       Summary    •Muscle pain usually lasts only a short time and goes away without treatment.      •This condition is caused by using muscles in a new or different way after a period of inactivity.      •If your muscle pain lasts longer than 3 days, tell your health care provider.      This information is not intended to replace advice given to you by your health care provider. Make sure you discuss any questions you have with your health care provider.
Otf Carter)

## 2023-02-19 NOTE — ED ADULT TRIAGE NOTE - CHIEF COMPLAINT QUOTE
patient c/o lower back pain, headache since yesterday.  notes subjective fever, last tylenol at 7 AM.  denies cough, congestion, urinary symptoms.

## 2023-02-19 NOTE — ED STATDOCS - CLINICAL SUMMARY MEDICAL DECISION MAKING FREE TEXT BOX
22-year-old female presents emergency department with atraumatic right-sided flank pain.  Could be MSK related to recent exercise however patient is not tender on exam.  Will evaluate with labs, urinalysis, IV fluids and pain medication, reassess. 22-year-old female presents emergency department with atraumatic right-sided flank pain.  Could be MSK related to recent exercise however patient is not tender on exam.  Will evaluate with labs, urinalysis, IV fluids and pain medication, reassess.          Pt. feeling better after medications.  Labs unremarkable.  Pt. to follow with spine outpatient.  Return for worsening symptoms.  Abbi Jewell PA-C

## 2023-02-19 NOTE — ED STATDOCS - CARE PROVIDER_API CALL
Jd Mullins; PhD)  Neurosurgery  [address] [facility] [facility], [address] [place] [address]  Phone: [phone]  Fax: [phone]  Follow Up Time:

## 2023-02-19 NOTE — ED ADULT NURSE NOTE - CAS ELECT INFOMATION PROVIDED
Two nurse skin check    Bilateral upper arm scattered bruising.  Skin tear to posterior head with dressing new clean dressing applied     DC instructions

## 2023-02-19 NOTE — ED ADULT NURSE NOTE - NS ED NURSE DC INFO COMPLEXITY
Labs needed for pt 5-11  Thanks Carolyn HERNANDEZ   
nonfasting labs ordered per Dr. Hunter. Jane Choe RN    
Simple: Patient demonstrates quick and easy understanding

## 2023-02-19 NOTE — ED ADULT NURSE NOTE - NSIMPLEMENTINTERV_GEN_ALL_ED
Implemented All Universal Safety Interventions:  [place] to call system. Call bell, personal items and telephone within reach. Instruct patient to call for assistance. Room bathroom lighting operational. Non-slip footwear when patient is off stretcher. Physically safe environment: no spills, clutter or unnecessary equipment. Stretcher in lowest position, wheels locked, appropriate side rails in place.

## 2023-02-19 NOTE — ED ADULT NURSE NOTE - OBJECTIVE STATEMENT
patient c/o lower back pain, headache since yesterday.  notes subjective fever, last tylenol at 7 AM.  denies cough, congestion, urinary symptoms. pt being worked up in supertrack and then will be brought to results waiting

## 2023-02-19 NOTE — ED STATDOCS - ATTENDING APP SHARED VISIT CONTRIBUTION OF CARE
I, Willy Horowitz DO, personally saw the patient with ACP.  I have personally performed a face to face diagnostic evaluation on this patient.  I have reviewed the ACP note and agree with the history, exam, and plan of care, except as noted.   The initial assessment was performed by myself and then the patient was handed off to the ACP. The patient was followed and re-evaluated by the ACP. All labs, imaging and procedures were evaluated and performed by the ACP and I was available for consultation if any questions in the patients care came up.

## 2023-02-20 LAB
CULTURE RESULTS: SIGNIFICANT CHANGE UP
SPECIMEN SOURCE: SIGNIFICANT CHANGE UP

## 2023-08-01 NOTE — BH DISCHARGE NOTE NURSING/SOCIAL WORK/PSYCH REHAB - NSDCPRGOAL_PSY_ALL_CORE
During the current hospitalization, patient has been working on psychiatric rehabilitation goals pertaining to identifying healthy and effective coping skills.  Patient reported playing music and reading.  In response to the COVID-19 crisis, hospital wide protocol has shifted and psychiatric rehabilitation staff re-evaluate unit programming on a daily basis in order to ensure safety.  Patient has made good progress.     Patient is observed slightly visible on the unit and is minimally social with peers. Patient is polite and receptive to staff engagement. Patient reported her mood is okay and she was assessed with a slightly constricted affect. Patient maintained appropriate eye contact during interaction with staff. Patient’s speech is normal in rate and volume. Patient’s ADLs and appearance are fairly-maintained. Patient’s thought process is linear. Patient’s thought content is future oriented. Patient maintained fair impulse control throughout hospitalization. Patient’s insight is assessed as fair and judgment is fair.   on the discharge service for the patient. I have reviewed and made amendments to the documentation where necessary.

## 2023-11-17 NOTE — ED PROVIDER NOTE - ENMT, MLM
Airway patent, Nasal mucosa clear. Mouth with normal mucosa. Throat has no vesicles, no oropharyngeal exudates and uvula is midline.
10